# Patient Record
Sex: FEMALE | Race: BLACK OR AFRICAN AMERICAN | NOT HISPANIC OR LATINO | Employment: FULL TIME | URBAN - METROPOLITAN AREA
[De-identification: names, ages, dates, MRNs, and addresses within clinical notes are randomized per-mention and may not be internally consistent; named-entity substitution may affect disease eponyms.]

---

## 2018-01-23 ENCOUNTER — HOSPITAL ENCOUNTER (EMERGENCY)
Facility: MEDICAL CENTER | Age: 23
End: 2018-01-23
Attending: EMERGENCY MEDICINE

## 2018-01-23 VITALS
TEMPERATURE: 98.6 F | DIASTOLIC BLOOD PRESSURE: 86 MMHG | SYSTOLIC BLOOD PRESSURE: 140 MMHG | HEART RATE: 72 BPM | RESPIRATION RATE: 18 BRPM | HEIGHT: 65 IN | BODY MASS INDEX: 23.14 KG/M2 | OXYGEN SATURATION: 98 % | WEIGHT: 138.89 LBS

## 2018-01-23 DIAGNOSIS — R51.9 ACUTE NONINTRACTABLE HEADACHE, UNSPECIFIED HEADACHE TYPE: ICD-10-CM

## 2018-01-23 DIAGNOSIS — K08.89 PAIN, DENTAL: ICD-10-CM

## 2018-01-23 PROCEDURE — 96372 THER/PROPH/DIAG INJ SC/IM: CPT

## 2018-01-23 PROCEDURE — 700102 HCHG RX REV CODE 250 W/ 637 OVERRIDE(OP): Performed by: EMERGENCY MEDICINE

## 2018-01-23 PROCEDURE — 99284 EMERGENCY DEPT VISIT MOD MDM: CPT

## 2018-01-23 PROCEDURE — A9270 NON-COVERED ITEM OR SERVICE: HCPCS | Performed by: EMERGENCY MEDICINE

## 2018-01-23 PROCEDURE — 700111 HCHG RX REV CODE 636 W/ 250 OVERRIDE (IP): Performed by: EMERGENCY MEDICINE

## 2018-01-23 RX ORDER — CLINDAMYCIN HYDROCHLORIDE 150 MG/1
300 CAPSULE ORAL ONCE
Status: COMPLETED | OUTPATIENT
Start: 2018-01-23 | End: 2018-01-23

## 2018-01-23 RX ORDER — OXYCODONE HYDROCHLORIDE AND ACETAMINOPHEN 5; 325 MG/1; MG/1
1-2 TABLET ORAL EVERY 4 HOURS PRN
Qty: 10 TAB | Refills: 0 | Status: SHIPPED | OUTPATIENT
Start: 2018-01-23 | End: 2018-01-26

## 2018-01-23 RX ORDER — KETOROLAC TROMETHAMINE 30 MG/ML
30 INJECTION, SOLUTION INTRAMUSCULAR; INTRAVENOUS ONCE
Status: COMPLETED | OUTPATIENT
Start: 2018-01-23 | End: 2018-01-23

## 2018-01-23 RX ORDER — CLINDAMYCIN HYDROCHLORIDE 300 MG/1
300 CAPSULE ORAL 3 TIMES DAILY
Qty: 21 CAP | Refills: 0 | Status: SHIPPED | OUTPATIENT
Start: 2018-01-23 | End: 2018-01-30

## 2018-01-23 RX ADMIN — KETOROLAC TROMETHAMINE 30 MG: 30 INJECTION, SOLUTION INTRAMUSCULAR at 01:48

## 2018-01-23 RX ADMIN — CLINDAMYCIN HYDROCHLORIDE 300 MG: 150 CAPSULE ORAL at 01:48

## 2018-01-23 ASSESSMENT — LIFESTYLE VARIABLES: DO YOU DRINK ALCOHOL: NO

## 2018-01-23 ASSESSMENT — PAIN SCALES - GENERAL: PAINLEVEL_OUTOF10: 9

## 2018-01-23 NOTE — ED NOTES
"Farrah Estrada    Chief Complaint   Patient presents with   • Dental Pain     upper and lower L side    • Migraine       Pt ambulatory to triage with above complaint. Dental pain on L side radiating to head/neck. +photophobia  Denies N/V. VSS.  Pt returned to lobby, educated on triage process, and to inform staff of any changes or concerns.    Blood Pressure: 140/86, Pulse: 72, Respiration: 18, Temperature: 37 °C (98.6 °F), Height: 165.1 cm (5' 5\"), Weight: 63 kg (138 lb 14.2 oz), Pulse Oximetry: 98 %    "

## 2018-01-23 NOTE — DISCHARGE INSTRUCTIONS
Follow-up with dentist, , this week for reevaluation and medication management.  Follow-up with primary care physician this week for reevaluation and close blood pressure monitoring.    Clindamycin 3 times daily for 1 week for suspected dental infection.  Continue ibuprofen every 4-6 hours as needed for discomfort. Percocet every 4-6 hours as needed for breakthrough pain.    Soft diet as tolerated.    Return to the emergency department for persistent or worsening dental pain, facial pain, headache, facial swelling or redness, oral swelling or difficulty breathing or swallowing, neck pain or stiffness, fever, vomiting or other new concerns.    Dental Pain  Dental pain may be caused by many things, including:  · Tooth decay (cavities or caries). Cavities cause the nerve of your tooth to be open to air and hot or cold temperatures. This can cause pain or discomfort.  · Abscess or infection. A dental abscess is an area that is full of infected pus from a bacterial infection in the inner part of the tooth (pulp). It usually happens at the end of the tooth's root.  · Injury.  · An unknown reason (idiopathic).  Your pain may be mild or severe. It may only happen when:  · You are chewing.  · You are exposed to hot or cold temperature.  · You are eating or drinking sugary foods or beverages, such as:  ¨ Soda.  ¨ Candy.  Your pain may also be there all of the time.  HOME CARE  Watch your dental pain for any changes. Do these things to lessen your discomfort:  · Take medicines only as told by your dentist.  · If your dentist tells you to take an antibiotic medicine, finish all of it even if you start to feel better.  · Keep all follow-up visits as told by your dentist. This is important.  · Do not apply heat to the outside of your face.  · Rinse your mouth or gargle with salt water if told by your dentist. This helps with pain and swelling.  ¨ You can make salt water by adding ¼ tsp of salt to 1 cup of warm  water.  · Apply ice to the painful area of your face:  ¨ Put ice in a plastic bag.  ¨ Place a towel between your skin and the bag.  ¨ Leave the ice on for 20 minutes, 2-3 times per day.  · Avoid foods or drinks that cause you pain, such as:  ¨ Very hot or very cold foods or drinks.  ¨ Sweet or sugary foods or drinks.  GET HELP IF:  · Your pain is not helped with medicines.  · Your symptoms are worse.  · You have new symptoms.  GET HELP RIGHT AWAY IF:  · You cannot open your mouth.  · You are having trouble breathing or swallowing.  · You have a fever.  · Your face, neck, or jaw is puffy (swollen).     This information is not intended to replace advice given to you by your health care provider. Make sure you discuss any questions you have with your health care provider.     Document Released: 06/05/2009 Document Revised: 05/03/2016 Document Reviewed: 12/14/2015  [employer] Interactive Patient Education ©2016 [employer]    General Headache Without Cause  A headache is pain or discomfort felt around the head or neck area. The specific cause of a headache may not be found. There are many causes and types of headaches. A few common ones are:  · Tension headaches.  · Migraine headaches.  · Cluster headaches.  · Chronic daily headaches.  HOME CARE INSTRUCTIONS   · Keep all follow-up appointments with your health care provider or any specialist referral.  · Only take over-the-counter or prescription medicines for pain or discomfort as directed by your health care provider.  · Lie down in a dark, quiet room when you have a headache.  · Keep a headache journal to find out what may trigger your migraine headaches. For example, write down:  ¨ What you eat and drink.  ¨ How much sleep you get.  ¨ Any change to your diet or medicines.  · Try massage or other relaxation techniques.  · Put ice packs or heat on the head and neck. Use these 3 to 4 times per day for 15 to 20 minutes each time, or as needed.  · Limit stress.  · Sit up  straight, and do not tense your muscles.  · Quit smoking if you smoke.  · Limit alcohol use.  · Decrease the amount of caffeine you drink, or stop drinking caffeine.  · Eat and sleep on a regular schedule.  · Get 7 to 9 hours of sleep, or as recommended by your health care provider.  · Keep lights dim if bright lights bother you and make your headaches worse.  SEEK MEDICAL CARE IF:   · You have problems with the medicines you were prescribed.  · Your medicines are not working.  · You have a change from the usual headache.  · You have nausea or vomiting.  SEEK IMMEDIATE MEDICAL CARE IF:   · Your headache becomes severe.  · You have a fever.  · You have a stiff neck.  · You have loss of vision.  · You have muscular weakness or loss of muscle control.  · You start losing your balance or have trouble walking.  · You feel faint or pass out.  · You have severe symptoms that are different from your first symptoms.     This information is not intended to replace advice given to you by your health care provider. Make sure you discuss any questions you have with your health care provider.     Document Released: 12/18/2006 Document Revised: 05/03/2016 Document Reviewed: 01/02/2013  [employer] Interactive Patient Education ©2016 [employer]

## 2018-01-23 NOTE — ED PROVIDER NOTES
"ED Provider Note    CHIEF COMPLAINT  Chief Complaint   Patient presents with   • Dental Pain     upper and lower L side    • Migraine       HPI  Farrah Estrada is a 22 y.o. female who ambulates to the emergency department with her significant other complaining of dental pain. Patient states she had sudden onset of pain in her left upper molar region a few hours ago, throbbing, constant with pain radiating towards her left ear and head. Headache, also throbbing, greater on the left side, constant, 6 out of 10. History of similar headaches frequently. No visual changes, slurred speech or focal weakness. No fever or chills. No nausea or vomiting. No facial swelling or redness. No neck pain or stiffness. No improvement with ibuprofen prior to arrival.    REVIEW OF SYSTEMS  See HPI for further details.     PAST MEDICAL HISTORY   denies    SOCIAL HISTORY  Social History     Social History Main Topics   • Smoking status: Never Smoker   • Smokeless tobacco: Not on file   • Alcohol use No   • Drug use: No   • Sexual activity: Not on file       SURGICAL HISTORY  patient denies any surgical history    CURRENT MEDICATIONS  Home Medications     Reviewed by Jose L Sam REnmaNEnma (Registered Nurse) on 01/23/18 at 0110  Med List Status: Not Addressed   Medication Last Dose Status   docusate sodium 100 MG Cap  Active   ibuprofen (MOTRIN) 600 MG Tab  Active   oxycodone-acetaminophen (PERCOCET) 5-325 MG Tab  Active   Prenatal MV-Min-Fe Fum-FA-DHA (PRENATAL 1 PO)  Active                ALLERGIES  Allergies   Allergen Reactions   • Aspirin Anaphylaxis     Hives and tongue swells     • Kiwi Extract Anaphylaxis     Hives and tongue swells.   Tolerates ibuprofen/Motrin without difficulty    PHYSICAL EXAM  VITAL SIGNS: /86   Pulse 72   Temp 37 °C (98.6 °F)   Resp 18   Ht 1.651 m (5' 5\")   Wt 63 kg (138 lb 14.2 oz)   LMP 01/06/2018 (Within Weeks)   SpO2 98%   Breastfeeding? No   BMI 23.11 kg/m²   Pulse ox interpretation: " I interpret this pulse ox as normal.  Constitutional: Alert in no apparent distress. Flat affect.  HENT: Normocephalic, atraumatic. Bilateral external ears normal, TMs clear bilaterally. No mastoid tenderness, erythema or induration. Nose normal. Moist mucous membranes.  Evidence for previous dental work, heavy feeling in Left lower teeth. No gross dental carry or tooth fracture noticed in region of the pain. There is some evidence for impacted left upper rear molar. No facial cellulitis. Minimal swelling left cheek without induration or fluctuance. Minimal tenderness to palpation that extends towards the anterior ear.  No gingival or other oral swelling or fluctuance. Uvula midline. Tolerating secretions. No lingual elevation. No stridor or dysphonia.  Eyes: Pupils are equal and reactive, Conjunctiva normal.   Neck: Normal range of motion, Supple. No meningeal irritation.  Lymphatic: No lymphadenopathy noted. No cervical, submandibular or auricular lymphadenopathy.  Cardiovascular: Normal peripheral perfusion.  Thorax & Lungs: Nonlabored respirations.  Skin: Warm, Dry, No erythema, No rash.   Musculoskeletal: Good range of motion in all major joints.   Neurologic: Alert , no gross focal deficit noted. Cranial nerves II through XII intact bilaterally. Speech clear and coherent. Gait stable independently.  Psychiatric: Flat affect. Cooperative.      COURSE & MEDICAL DECISION MAKING  Nursing notes and vital signs were reviewed. (See chart for details)  The patients records were reviewed, history was obtained from the patient;     ED evaluation for dental pain is quite unrevealing, however symptomatology is most consistent with infected dental caries. Symptomatology is similar to previous infection. There is evidence for a partially impacted wisdom tooth as well. A trigeminal neuralgia, parotitis remain in the differential but appear less likely at this time. No clinical evidence for otitis media, pharyngitis,  mastoiditis or meningitis. Airway is intact. Tolerating oral medications and water without difficulty. Vital signs are stable without fever or tachycardia. Pain significantly improved after Toradol. 1st dose of clindamycin (penicillin allergic) given in the emergency department.    Patient is stable for discharge at this time, anticipatory guidance provided, clindamycin for 7 days, Percocet for breakthrough pain, close follow-up is encouraged, and strict ED return instructions have been detailed. Patient is agreeable to the disposition and plan.    Patient's blood pressure was elevated in the emergency department, and has been referred to primary care for close monitoring.     reviewed, no pattern for concern.  In prescribing controlled substances to this patient, I certify that I have obtained and reviewed the medical history of Farrah Estrada. I have also made a good yousuf effort to obtain applicable records from other providers who have treated the patient and records did not demonstrate any increased risk of substance abuse that would prevent me from prescribing controlled substances.     I have conducted a physical exam and documented it. I have reviewed Ms. Estrada’s prescription history as maintained by the [place] Prescription Monitoring Program.     I have assessed the patient’s risk for abuse, dependency, and addiction using the validated Opioid Risk Tool available at https://www.mdcalc.com/qypcsg-imbn-qppf-ort-narcotic-abuse.     Given the above, I believe the benefits of controlled substance therapy outweigh the risks. The reasons for prescribing controlled substances include non-narcotic, oral analgesic alternatives have been inadequate for pain control. Accordingly, I have discussed the risk and benefits, treatment plan, and alternative therapies with the patient.       FINAL IMPRESSION  (K08.89) Pain, dental  (R51) Acute nonintractable headache, unspecified headache type      Electronically signed  by: Riana Kelly, 1/23/2018 1:33 AM      This dictation was created using voice recognition software. The accuracy of the dictation is limited to the abilities of the software. I expect there may be some errors of grammar and possibly content. The nursing notes were reviewed and certain aspects of this information were incorporated into this note.

## 2018-04-27 ENCOUNTER — HOSPITAL ENCOUNTER (EMERGENCY)
Facility: MEDICAL CENTER | Age: 23
End: 2018-04-28
Attending: EMERGENCY MEDICINE

## 2018-04-27 DIAGNOSIS — K02.9 PAIN DUE TO DENTAL CARIES: ICD-10-CM

## 2018-04-27 PROCEDURE — 700111 HCHG RX REV CODE 636 W/ 250 OVERRIDE (IP): Performed by: EMERGENCY MEDICINE

## 2018-04-27 PROCEDURE — 99284 EMERGENCY DEPT VISIT MOD MDM: CPT

## 2018-04-27 PROCEDURE — 96372 THER/PROPH/DIAG INJ SC/IM: CPT

## 2018-04-27 RX ORDER — MORPHINE SULFATE 4 MG/ML
4 INJECTION, SOLUTION INTRAMUSCULAR; INTRAVENOUS ONCE
Status: COMPLETED | OUTPATIENT
Start: 2018-04-28 | End: 2018-04-27

## 2018-04-27 RX ORDER — ONDANSETRON 2 MG/ML
4 INJECTION INTRAMUSCULAR; INTRAVENOUS ONCE
Status: COMPLETED | OUTPATIENT
Start: 2018-04-28 | End: 2018-04-27

## 2018-04-27 RX ADMIN — ONDANSETRON 4 MG: 2 INJECTION, SOLUTION INTRAMUSCULAR; INTRAVENOUS at 23:40

## 2018-04-27 RX ADMIN — MORPHINE SULFATE 4 MG: 4 INJECTION INTRAVENOUS at 23:40

## 2018-04-27 ASSESSMENT — PAIN SCALES - GENERAL
PAINLEVEL_OUTOF10: 3
PAINLEVEL_OUTOF10: 10

## 2018-04-27 ASSESSMENT — ENCOUNTER SYMPTOMS
VOMITING: 1
FEVER: 1

## 2018-04-28 VITALS
TEMPERATURE: 98.7 F | SYSTOLIC BLOOD PRESSURE: 135 MMHG | BODY MASS INDEX: 22.08 KG/M2 | OXYGEN SATURATION: 96 % | RESPIRATION RATE: 18 BRPM | HEIGHT: 65 IN | HEART RATE: 81 BPM | WEIGHT: 132.5 LBS | DIASTOLIC BLOOD PRESSURE: 74 MMHG

## 2018-04-28 PROCEDURE — A9270 NON-COVERED ITEM OR SERVICE: HCPCS | Performed by: EMERGENCY MEDICINE

## 2018-04-28 PROCEDURE — 700101 HCHG RX REV CODE 250: Performed by: EMERGENCY MEDICINE

## 2018-04-28 PROCEDURE — 64400 NJX AA&/STRD TRIGEMINAL NRV: CPT

## 2018-04-28 PROCEDURE — 700102 HCHG RX REV CODE 250 W/ 637 OVERRIDE(OP): Performed by: EMERGENCY MEDICINE

## 2018-04-28 RX ORDER — HYDROCODONE BITARTRATE AND ACETAMINOPHEN 5; 325 MG/1; MG/1
1 TABLET ORAL ONCE
Status: COMPLETED | OUTPATIENT
Start: 2018-04-28 | End: 2018-04-28

## 2018-04-28 RX ORDER — LIDOCAINE HYDROCHLORIDE 10 MG/ML
20 INJECTION, SOLUTION INFILTRATION; PERINEURAL ONCE
Status: COMPLETED | OUTPATIENT
Start: 2018-04-28 | End: 2018-04-28

## 2018-04-28 RX ORDER — HYDROCODONE BITARTRATE AND ACETAMINOPHEN 5; 325 MG/1; MG/1
1-2 TABLET ORAL EVERY 6 HOURS PRN
Qty: 12 TAB | Refills: 0 | Status: SHIPPED | OUTPATIENT
Start: 2018-04-28 | End: 2018-05-01

## 2018-04-28 RX ADMIN — HYDROCODONE BITARTRATE AND ACETAMINOPHEN 1 TABLET: 5; 325 TABLET ORAL at 01:04

## 2018-04-28 RX ADMIN — LIDOCAINE HYDROCHLORIDE 20 ML: 10 INJECTION, SOLUTION INFILTRATION; PERINEURAL at 01:00

## 2018-04-28 ASSESSMENT — PAIN SCALES - GENERAL: PAINLEVEL_OUTOF10: 3

## 2018-04-28 NOTE — DISCHARGE INSTRUCTIONS
Return to the ER for more pain, swelling, fever, or other concerns. Recheck if not improved in 24 hours    Dental Caries  Dental caries (cavities) are areas of tooth decay. Cavities are usually caused by a combination of poor dental care; sugar; tobacco, alcohol, and drug abuse; decreased saliva production; and receding gums. If cavities are not treated by a dentist, they grow in size. This can cause toothaches, infection, and loss of the tooth.  Cavities of the outer tooth enamel do not cause symptoms. Dental pain from cold drinks may be the first sign the enamel has broken down and decay has spread toward the root of the tooth. This can cause the tooth to die or become infected. If a cavity is treated before it causes toothache, the tooth can usually be saved. Cavities can be prevented by good oral hygiene. Brushing your teeth in the morning and before bed, and using dental floss once daily helps remove plaque and reduce bacteria.  Candy, soft drinks, and other sources of sugar promote tooth decay by promoting the growth of bacteria in the mouth. Proper diet, fluoride, dental cleaning, and fillings are important in preventing the loss of teeth from decay. Antibiotics, root canal treatment, or dental extraction may be needed if the decay is severe. Take any pain medication or antibiotics as directed by your caregiver. It is important that you follow up with a dentist for definitive care.  SEEK MEDICAL CARE IF:   · You or your child has an oral temperature above 102° F (38.9° C).   · There is difficulty opening the mouth.   · There is difficulty swallowing or handling secretions.   · There is difficulty breathing.   · There is chest pain.   · There are worsening or concerning symptoms.   Document Released: 01/25/2006 Document Revised: 03/11/2013 Document Reviewed: 04/12/2011  [employer]® Patient Information ©2013 [employer].

## 2018-04-28 NOTE — ED TRIAGE NOTES
"Chief Complaint   Patient presents with   • Dental Pain     since yesterday. pt has left sided facial pain. pt was seen at [facility] earlier today for same pain and was given clindamycin and ibuprofen, but with no releif.    • N/V     pt vomited 200 ml in triage.      Pt ambulatory to triage with above complaint, VSS, pt educated on triage process, placed back in lobby, pt instructed to notify staff of any issues.     Blood pressure 140/96, pulse 73, temperature 36.7 °C (98.1 °F), temperature source Temporal, resp. rate 18, height 1.651 m (5' 5\"), weight 60.1 kg (132 lb 7.9 oz), SpO2 97 %.    "

## 2018-04-28 NOTE — ED PROVIDER NOTES
ED Provider Note    Scribed for Anthony Kennedy M.D. by Fatoumata Rebolledo. 4/27/2018, 11:28 PM.    Primary care provider: [facility] (Inactive)  Means of arrival: walk in   History obtained from: patient   History limited by: none     CHIEF COMPLAINT  Chief Complaint   Patient presents with   • Dental Pain     since yesterday. pt has left sided facial pain. pt was seen at [facility] earlier today for same pain and was given clindamycin and ibuprofen, but with no releif.    • N/V     pt vomited 200 ml in triage.        HPI  Farrah Estrada is a 22 y.o. female who presents to the Emergency Department for generalized left sided dental pain onset one day ago. Her pain radiates to her face. Patient reports associated subjective fever. She was evaluated at [facility] earlier today and prescribed Clindamycin and Motrin which she took with no relief of her pain. She has not been seen by a dentist today. She had one episode of vomiting while in the waiting room.       REVIEW OF SYSTEMS  Review of Systems   Constitutional: Positive for fever. Chills: subjective    HENT:        + dental pain   Gastrointestinal: Positive for vomiting.    E.     PAST MEDICAL HISTORY   has a past medical history of Known health problems: none.      SURGICAL HISTORY  patient denies any surgical history      SOCIAL HISTORY  Social History   Substance Use Topics   • Smoking status: Never Smoker        • Alcohol use No      History   Drug Use No       FAMILY HISTORY  None noted       CURRENT MEDICATIONS  Home Medications     Reviewed by Keely Padilla R.N. (Registered Nurse) on 04/27/18 at 2322  Med List Status: Not Addressed   Medication Last Dose Status   docusate sodium 100 MG Cap  Active   ibuprofen (MOTRIN) 600 MG Tab  Active   Prenatal MV-Min-Fe Fum-FA-DHA (PRENATAL 1 PO)  Active                ALLERGIES  Allergies   Allergen Reactions   • Amoxicillin Anaphylaxis   • Aspirin Anaphylaxis     Hives and tongue swells     • Kiwi Extract  "Anaphylaxis     Hives and tongue swells.       PHYSICAL EXAM  VITAL SIGNS: /96   Pulse 73   Temp 36.7 °C (98.1 °F) (Temporal)   Resp 18   Ht 1.651 m (5' 5\")   Wt 60.1 kg (132 lb 7.9 oz)   SpO2 97%   BMI 22.05 kg/m²   Vitals reviewed.  Constitutional: Well developed, Well nourished, No acute distress, Non-toxic appearance.   HENT: Normocephalic, Atraumatic, Bilateral external ears normal, Oropharynx moist, No oral exudates, Nose normal. Tooth number 13 has crown in place with tenderness to percussion.  Not significant for swollen.  No facial swelling.  No sinus tenderness.  Eyes: PERRL, EOMI, Conjunctiva normal, No discharge.   Neck: Normal range of motion, No tenderness, Supple, No stridor.   Cardiovascular: Normal heart rate, Normal rhythm, No murmurs, No rubs, No gallops.   Thorax & Lungs: Normal breath sounds, No respiratory distress, No wheezing,    Musculoskeletal: Good range of motion in all major joints.  Neurologic: Alert,No focal deficits noted.   Psychiatric: Anxious.         COURSE & MEDICAL DECISION MAKING  Pertinent Labs & Imaging studies reviewed. (See chart for details)    11:28 PM Patient seen and examined at bedside. Patient will be treated with morphine 4 mg and Zofran 4 mg for her symptoms.      Patient was reassessed and ordered Norco.  Her pain is not well controlled and a lidocaine bile for injection for a dental block.  This is performed.  The patient actually had pretty good results.    Procedure note.    The patient's infraorbital nerve is blocked with 3 mL of 1% lidocaine without epinephrine.  No complication.  Tolerated well.  Good relief.      .  The patient has been here before for dental pain.  She has other previous workup for pain.  The similar area.  Other diagnostic etiologies considered in the past for trigeminal neuralgia.  At this point.  Her tooth is exquisitely tender to percussion.  I think this is the source of her pain.  She didn't seem a dentist and told she " requires extraction was not be able to she is afebrile.  She has no purulent drainage, no sniffing any headache or neck stiffness.  No signs of meningitis.  She is on antibiotics.  She has a penicillin allergy.  She is not on pain meds.    Pressure monitoring.  History is reviewed.  Medical score is reviewed.  Remainder the website is otherwise unavailable for review.    The patient is referred to a primary physician for blood pressure management, diabetic screening, and for all other preventative health concerns.          In prescribing controlled substances to this patient, I certify that I have obtained and reviewed the medical history of Farrah Estrada. I have also made a good yousuf effort to obtain applicable records from other providers who have treated the patient and no other records are available at this time.     I have conducted a physical exam and documented it. I have reviewed Ms. Estrada’s prescription history as maintained by the [place] Prescription Monitoring Program.     I have assessed the patient’s risk for abuse, dependency, and addiction using the validated Opioid Risk Tool available at https://www.mdcalc.com/eocokd-yidn-uynb-ort-narcotic-abuse.     Given the above, I believe the benefits of controlled substance therapy outweigh the risks. The reasons for prescribing controlled substances include in my professional opinion, controlled substances are the only reasonable choice for this patient because severe pain. Accordingly, I have discussed the risk and benefits, treatment plan, and alternative therapies with the patient.         DISPOSITION:  Patient will be discharged home in stable condition.    FOLLOW UP:  [address] [address] [phone]  Schedule an appointment as soon as possible for a visit in 2 days        OUTPATIENT MEDICATIONS:  New Prescriptions    HYDROCODONE-ACETAMINOPHEN (NORCO) 5-325 MG TAB PER TABLET    Take 1-2 Tabs by  mouth every 6 hours as needed for up to 3 days.       FINAL IMPRESSION  1. Pain due to dental caries    2.  Dental block       DAMON, Fatoumata Rebolledo (Scribe), am scribing for, and in the presence of, Anthony Kennedy M.D..  Electronically signed by: Fatoumata Rebolledo (Scribe), 4/27/2018  Anthony JOSE M.D. personally performed the services described in this documentation, as scribed by Fatoumata Rebolledo in my presence, and it is both accurate and complete.    The note accurately reflects work and decisions made by me.  Anthony Kennedy  4/28/2018  1:25 AM

## 2018-04-28 NOTE — ED NOTES
Pt AOx4.  Pt given discharge instructions with narcotic pain medication education to not drive while taking medication and pt verbalized understanding.  Pt signed narcotic consent form and discharge paperwork.  Pt ambulated to lobby with steady gait.

## 2018-05-25 ENCOUNTER — APPOINTMENT (OUTPATIENT)
Dept: RADIOLOGY | Facility: MEDICAL CENTER | Age: 23
End: 2018-05-25
Attending: EMERGENCY MEDICINE
Payer: OTHER MISCELLANEOUS

## 2018-05-25 ENCOUNTER — HOSPITAL ENCOUNTER (EMERGENCY)
Facility: MEDICAL CENTER | Age: 23
End: 2018-05-25
Attending: EMERGENCY MEDICINE
Payer: OTHER MISCELLANEOUS

## 2018-05-25 VITALS
RESPIRATION RATE: 14 BRPM | WEIGHT: 135.58 LBS | HEIGHT: 65 IN | DIASTOLIC BLOOD PRESSURE: 60 MMHG | BODY MASS INDEX: 22.59 KG/M2 | TEMPERATURE: 98.3 F | SYSTOLIC BLOOD PRESSURE: 108 MMHG | HEART RATE: 75 BPM | OXYGEN SATURATION: 95 %

## 2018-05-25 DIAGNOSIS — M54.2 NECK PAIN: ICD-10-CM
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
## 2018-05-25 PROCEDURE — 99284 EMERGENCY DEPT VISIT MOD MDM: CPT

## 2018-05-25 PROCEDURE — 71045 X-RAY EXAM CHEST 1 VIEW: CPT

## 2018-05-25 PROCEDURE — 73030 X-RAY EXAM OF SHOULDER: CPT | Mod: RT

## 2018-05-25 PROCEDURE — 72125 CT NECK SPINE W/O DYE: CPT

## 2018-05-25 PROCEDURE — 700102 HCHG RX REV CODE 250 W/ 637 OVERRIDE(OP): Performed by: EMERGENCY MEDICINE

## 2018-05-25 PROCEDURE — A9270 NON-COVERED ITEM OR SERVICE: HCPCS | Performed by: EMERGENCY MEDICINE

## 2018-05-25 RX ORDER — CYCLOBENZAPRINE HCL 10 MG
10 TABLET ORAL 3 TIMES DAILY PRN
Qty: 20 TAB | Refills: 0 | Status: SHIPPED | OUTPATIENT
Start: 2018-05-25 | End: 2018-08-23

## 2018-05-25 RX ORDER — HYDROCODONE BITARTRATE AND ACETAMINOPHEN 5; 325 MG/1; MG/1
1 TABLET ORAL ONCE
Status: COMPLETED | OUTPATIENT
Start: 2018-05-25 | End: 2018-05-25

## 2018-05-25 RX ADMIN — HYDROCODONE BITARTRATE AND ACETAMINOPHEN 1 TABLET: 5; 325 TABLET ORAL at 16:23

## 2018-05-25 ASSESSMENT — PAIN SCALES - GENERAL
PAINLEVEL_OUTOF10: 6
PAINLEVEL_OUTOF10: 5
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
## 2018-05-25 NOTE — ED TRIAGE NOTES
RN agrees with triage note. Pt ambulatory to room. Denies any numbness or tingling to extremities.
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
## 2018-05-26 NOTE — DISCHARGE INSTRUCTIONS
Return here if you have new or worse symptoms. If not well in one week see your doctor for recheck

## 2018-05-26 NOTE — ED NOTES
Pt given d/c instructions and prescriptions x 1. Pt instructed not to drink or drive while taking medications. Pt verbalized understanding. Pt d/c to self

## 2018-08-23 ENCOUNTER — HOSPITAL ENCOUNTER (EMERGENCY)
Facility: MEDICAL CENTER | Age: 23
End: 2018-08-23
Attending: EMERGENCY MEDICINE
Payer: MEDICAID

## 2018-08-23 ENCOUNTER — APPOINTMENT (OUTPATIENT)
Dept: RADIOLOGY | Facility: MEDICAL CENTER | Age: 23
End: 2018-08-23
Attending: EMERGENCY MEDICINE
Payer: MEDICAID

## 2018-08-23 VITALS
OXYGEN SATURATION: 98 % | WEIGHT: 132.5 LBS | TEMPERATURE: 98.1 F | RESPIRATION RATE: 18 BRPM | BODY MASS INDEX: 22.08 KG/M2 | SYSTOLIC BLOOD PRESSURE: 105 MMHG | HEART RATE: 75 BPM | DIASTOLIC BLOOD PRESSURE: 68 MMHG | HEIGHT: 65 IN

## 2018-08-23 DIAGNOSIS — Z33.1 IUP (INTRAUTERINE PREGNANCY), INCIDENTAL: ICD-10-CM

## 2018-08-23 DIAGNOSIS — O20.0 THREATENED MISCARRIAGE: ICD-10-CM

## 2018-08-23 DIAGNOSIS — R10.2 PELVIC PAIN: ICD-10-CM

## 2018-08-23 LAB
ALBUMIN SERPL BCP-MCNC: 4.2 G/DL (ref 3.2–4.9)
ALBUMIN/GLOB SERPL: 1.4 G/DL
ALP SERPL-CCNC: 48 U/L (ref 30–99)
ALT SERPL-CCNC: 12 U/L (ref 2–50)
ANION GAP SERPL CALC-SCNC: 5 MMOL/L (ref 0–11.9)
APPEARANCE UR: CLEAR
AST SERPL-CCNC: 18 U/L (ref 12–45)
B-HCG SERPL-ACNC: ABNORMAL MIU/ML (ref 0–10)
BASOPHILS # BLD AUTO: 0.5 % (ref 0–1.8)
BASOPHILS # BLD: 0.03 K/UL (ref 0–0.12)
BILIRUB SERPL-MCNC: 0.7 MG/DL (ref 0.1–1.5)
BILIRUB UR QL STRIP.AUTO: NEGATIVE
BUN SERPL-MCNC: 5 MG/DL (ref 8–22)
CALCIUM SERPL-MCNC: 8.7 MG/DL (ref 8.4–10.2)
CHLORIDE SERPL-SCNC: 109 MMOL/L (ref 96–112)
CO2 SERPL-SCNC: 20 MMOL/L (ref 20–33)
COLOR UR: YELLOW
CREAT SERPL-MCNC: 0.53 MG/DL (ref 0.5–1.4)
EOSINOPHIL # BLD AUTO: 0.05 K/UL (ref 0–0.51)
EOSINOPHIL NFR BLD: 0.8 % (ref 0–6.9)
ERYTHROCYTE [DISTWIDTH] IN BLOOD BY AUTOMATED COUNT: 39.8 FL (ref 35.9–50)
GLOBULIN SER CALC-MCNC: 3 G/DL (ref 1.9–3.5)
GLUCOSE SERPL-MCNC: 79 MG/DL (ref 65–99)
GLUCOSE UR STRIP.AUTO-MCNC: NEGATIVE MG/DL
HCT VFR BLD AUTO: 36.1 % (ref 37–47)
HGB BLD-MCNC: 11.9 G/DL (ref 12–16)
IMM GRANULOCYTES # BLD AUTO: 0.03 K/UL (ref 0–0.11)
IMM GRANULOCYTES NFR BLD AUTO: 0.5 % (ref 0–0.9)
KETONES UR STRIP.AUTO-MCNC: NEGATIVE MG/DL
LEUKOCYTE ESTERASE UR QL STRIP.AUTO: NEGATIVE
LIPASE SERPL-CCNC: 28 U/L (ref 7–58)
LYMPHOCYTES # BLD AUTO: 1.82 K/UL (ref 1–4.8)
LYMPHOCYTES NFR BLD: 29.4 % (ref 22–41)
MCH RBC QN AUTO: 28.4 PG (ref 27–33)
MCHC RBC AUTO-ENTMCNC: 33 G/DL (ref 33.6–35)
MCV RBC AUTO: 86.2 FL (ref 81.4–97.8)
MICRO URNS: NORMAL
MONOCYTES # BLD AUTO: 0.34 K/UL (ref 0–0.85)
MONOCYTES NFR BLD AUTO: 5.5 % (ref 0–13.4)
NEUTROPHILS # BLD AUTO: 3.91 K/UL (ref 2–7.15)
NEUTROPHILS NFR BLD: 63.3 % (ref 44–72)
NITRITE UR QL STRIP.AUTO: NEGATIVE
NRBC # BLD AUTO: 0 K/UL
NRBC BLD-RTO: 0 /100 WBC
PH UR STRIP.AUTO: 6 [PH]
PLATELET # BLD AUTO: 260 K/UL (ref 164–446)
PMV BLD AUTO: 10.8 FL (ref 9–12.9)
POTASSIUM SERPL-SCNC: 3.4 MMOL/L (ref 3.6–5.5)
PROT SERPL-MCNC: 7.2 G/DL (ref 6–8.2)
PROT UR QL STRIP: NEGATIVE MG/DL
RBC # BLD AUTO: 4.19 M/UL (ref 4.2–5.4)
RBC UR QL AUTO: NEGATIVE
SODIUM SERPL-SCNC: 134 MMOL/L (ref 135–145)
SP GR UR STRIP.AUTO: 1.02
WBC # BLD AUTO: 6.2 K/UL (ref 4.8–10.8)

## 2018-08-23 PROCEDURE — 83690 ASSAY OF LIPASE: CPT

## 2018-08-23 PROCEDURE — 99284 EMERGENCY DEPT VISIT MOD MDM: CPT

## 2018-08-23 PROCEDURE — 85025 COMPLETE CBC W/AUTO DIFF WBC: CPT

## 2018-08-23 PROCEDURE — 36415 COLL VENOUS BLD VENIPUNCTURE: CPT

## 2018-08-23 PROCEDURE — 81003 URINALYSIS AUTO W/O SCOPE: CPT

## 2018-08-23 PROCEDURE — 80053 COMPREHEN METABOLIC PANEL: CPT

## 2018-08-23 PROCEDURE — 76817 TRANSVAGINAL US OBSTETRIC: CPT

## 2018-08-23 PROCEDURE — 84702 CHORIONIC GONADOTROPIN TEST: CPT

## 2018-08-23 ASSESSMENT — ENCOUNTER SYMPTOMS
ABDOMINAL PAIN: 1
CHILLS: 0
BACK PAIN: 0
HEADACHES: 0
SHORTNESS OF BREATH: 0
NAUSEA: 1
FEVER: 0
VOMITING: 1

## 2018-08-23 NOTE — ED NOTES
Med Rec completed per patient  Allergies reviewed  No ORAL antibiotics in last 30 days    Patient stated she takes no OTC or prescription medications

## 2018-08-23 NOTE — ED NOTES
"Chief Complaint   Patient presents with   • Pelvic Pain     x 4 days   • Nausea     /59   Pulse 98   Temp 36.6 °C (97.8 °F)   Resp 18   Ht 1.651 m (5' 5\")   Wt 60.1 kg (132 lb 7.9 oz)   LMP 06/15/2018   SpO2 98%   BMI 22.05 kg/m²     Pt reports being about eight weeks pregnant, went to [facility] two days ago and was told unable to complete US and to return to [facility].  Pt currently denies any bleeding or discharge.  "
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
## 2018-08-23 NOTE — DISCHARGE INSTRUCTIONS
Ultrasound today shows an intrauterine pregnancy that is small, measures 5 weeks 5 days.  Your beta quantitative hCG was a little bit over 23,000.  It is unclear at this time, whether this is just an early gestation or a pregnancy that is not developing properly as there is no heartbeat detected.  It is important that you follow-up with your OB/GYN, [facility], for follow-up in the next 1-2 weeks.      Threatened Miscarriage  A threatened miscarriage is when you have vaginal bleeding during your first 20 weeks of pregnancy but the pregnancy has not ended. Your doctor will do tests to make sure you are still pregnant. The cause of the bleeding may not be known. This condition does not mean your pregnancy will end. It does increase the risk of it ending (complete miscarriage).  Follow these instructions at home:  · Make sure you keep all your doctor visits for prenatal care.  · Get plenty of rest.  · Do not have sex or use tampons if you have vaginal bleeding.  · Do not douche.  · Do not smoke or use drugs.  · Do not drink alcohol.  · Avoid caffeine.  Contact a doctor if:  · You have light bleeding from your vagina.  · You have belly pain or cramping.  · You have a fever.  Get help right away if:  · You have heavy bleeding from your vagina.  · You have clots of blood coming from your vagina.  · You have bad pain or cramps in your low back or belly.  · You have fever, chills, and bad belly pain.  This information is not intended to replace advice given to you by your health care provider. Make sure you discuss any questions you have with your health care provider.  Document Released: 11/30/2009 Document Revised: 05/25/2017 Document Reviewed: 10/14/2014  Elsevier Interactive Patient Education © 2017 [employer]

## 2018-09-06 ENCOUNTER — HOSPITAL ENCOUNTER (EMERGENCY)
Facility: MEDICAL CENTER | Age: 23
End: 2018-09-06
Attending: EMERGENCY MEDICINE
Payer: MEDICAID

## 2018-09-06 VITALS
OXYGEN SATURATION: 100 % | SYSTOLIC BLOOD PRESSURE: 113 MMHG | BODY MASS INDEX: 21.5 KG/M2 | WEIGHT: 129.19 LBS | RESPIRATION RATE: 15 BRPM | TEMPERATURE: 97 F | HEART RATE: 88 BPM | DIASTOLIC BLOOD PRESSURE: 63 MMHG

## 2018-09-06 DIAGNOSIS — R11.2 NON-INTRACTABLE VOMITING WITH NAUSEA, UNSPECIFIED VOMITING TYPE: ICD-10-CM

## 2018-09-06 DIAGNOSIS — Z3A.08 8 WEEKS GESTATION OF PREGNANCY: ICD-10-CM

## 2018-09-06 LAB
ALBUMIN SERPL BCP-MCNC: 5.6 G/DL (ref 3.2–4.9)
ALBUMIN/GLOB SERPL: 1.2 G/DL
ALP SERPL-CCNC: 64 U/L (ref 30–99)
ALT SERPL-CCNC: 14 U/L (ref 2–50)
ANION GAP SERPL CALC-SCNC: 13 MMOL/L (ref 0–11.9)
APPEARANCE UR: ABNORMAL
AST SERPL-CCNC: 18 U/L (ref 12–45)
BACTERIA #/AREA URNS HPF: ABNORMAL /HPF
BILIRUB SERPL-MCNC: 1.2 MG/DL (ref 0.1–1.5)
BILIRUB UR QL STRIP.AUTO: NEGATIVE
BUN SERPL-MCNC: 6 MG/DL (ref 8–22)
CALCIUM SERPL-MCNC: 10.5 MG/DL (ref 8.5–10.5)
CHLORIDE SERPL-SCNC: 100 MMOL/L (ref 96–112)
CO2 SERPL-SCNC: 20 MMOL/L (ref 20–33)
COLOR UR: YELLOW
CREAT SERPL-MCNC: 0.56 MG/DL (ref 0.5–1.4)
EPI CELLS #/AREA URNS HPF: ABNORMAL /HPF
GLOBULIN SER CALC-MCNC: 4.5 G/DL (ref 1.9–3.5)
GLUCOSE SERPL-MCNC: 76 MG/DL (ref 65–99)
GLUCOSE UR STRIP.AUTO-MCNC: NEGATIVE MG/DL
HYALINE CASTS #/AREA URNS LPF: ABNORMAL /LPF
KETONES UR STRIP.AUTO-MCNC: >=160 MG/DL
LEUKOCYTE ESTERASE UR QL STRIP.AUTO: ABNORMAL
MICRO URNS: ABNORMAL
NITRITE UR QL STRIP.AUTO: NEGATIVE
PH UR STRIP.AUTO: >=9 [PH]
POTASSIUM SERPL-SCNC: 3.5 MMOL/L (ref 3.6–5.5)
PROT SERPL-MCNC: 10.1 G/DL (ref 6–8.2)
PROT UR QL STRIP: 30 MG/DL
RBC # URNS HPF: ABNORMAL /HPF
RBC UR QL AUTO: NEGATIVE
SODIUM SERPL-SCNC: 133 MMOL/L (ref 135–145)
SP GR UR STRIP.AUTO: 1.03
UROBILINOGEN UR STRIP.AUTO-MCNC: 1 MG/DL
WBC #/AREA URNS HPF: ABNORMAL /HPF

## 2018-09-06 PROCEDURE — 80053 COMPREHEN METABOLIC PANEL: CPT

## 2018-09-06 PROCEDURE — 700111 HCHG RX REV CODE 636 W/ 250 OVERRIDE (IP): Performed by: EMERGENCY MEDICINE

## 2018-09-06 PROCEDURE — 700105 HCHG RX REV CODE 258: Performed by: EMERGENCY MEDICINE

## 2018-09-06 PROCEDURE — 99285 EMERGENCY DEPT VISIT HI MDM: CPT

## 2018-09-06 PROCEDURE — 36415 COLL VENOUS BLD VENIPUNCTURE: CPT

## 2018-09-06 PROCEDURE — 81001 URINALYSIS AUTO W/SCOPE: CPT

## 2018-09-06 PROCEDURE — 96374 THER/PROPH/DIAG INJ IV PUSH: CPT

## 2018-09-06 RX ORDER — ONDANSETRON 2 MG/ML
4 INJECTION INTRAMUSCULAR; INTRAVENOUS ONCE
Status: COMPLETED | OUTPATIENT
Start: 2018-09-06 | End: 2018-09-06

## 2018-09-06 RX ORDER — DEXTROSE AND SODIUM CHLORIDE 5; .45 G/100ML; G/100ML
INJECTION, SOLUTION INTRAVENOUS CONTINUOUS
Status: ACTIVE | OUTPATIENT
Start: 2018-09-06 | End: 2018-09-06

## 2018-09-06 RX ORDER — ONDANSETRON 4 MG/1
4 TABLET, ORALLY DISINTEGRATING ORAL EVERY 8 HOURS PRN
Qty: 20 TAB | Refills: 0 | Status: SHIPPED | OUTPATIENT
Start: 2018-09-06 | End: 2018-10-01

## 2018-09-06 RX ADMIN — ONDANSETRON 4 MG: 2 INJECTION INTRAMUSCULAR; INTRAVENOUS at 13:12

## 2018-09-06 RX ADMIN — DEXTROSE AND SODIUM CHLORIDE: 5; 450 INJECTION, SOLUTION INTRAVENOUS at 13:12

## 2018-09-06 ASSESSMENT — PAIN SCALES - GENERAL: PAINLEVEL_OUTOF10: 0

## 2018-09-06 ASSESSMENT — LIFESTYLE VARIABLES: DO YOU DRINK ALCOHOL: NO

## 2018-09-06 NOTE — ED TRIAGE NOTES
23 y/o female ambulatory to triage with c/o vomiting x 3 days. Pt states she believes she is 8 weeks pregnant.

## 2018-09-06 NOTE — ED NOTES
Pt states she is approx 8 weeks pregnant. Pt complains of n/v and loose stools x 3-4 days. Pt denies cramping or vaginal bleeding. Pt states this is her 4 pregnancy with 2 prior miscarriages and 1 live birth. Bf at bedside. NAD. VSS. Call light within reach. Will continue to monitor.

## 2018-09-06 NOTE — ED PROVIDER NOTES
ED Provider Note    CHIEF COMPLAINT  Chief Complaint   Patient presents with   • Vomiting     x 3 days   • Pregnancy     states she believes she is 8 weeks pregnant       HPI  Farrah Estrada is a 22 y.o. female who presents for evaluation of vomiting.  She has had nausea and vomiting over the past 3 days.  She is 78 weeks pregnant.  She was actually seen here on the 23rd of last month and had an ultrasound that showed an intrauterine gestation.  She has had no pelvic pain or vaginal bleeding.  She has had no fevers.  She states that she is tried everything she could find on line but nothing seems to help.    REVIEW OF SYSTEMS  See HPI for further details. All other systems negative.    PAST MEDICAL HISTORY  Past Medical History:   Diagnosis Date   • Known health problems: none        FAMILY HISTORY  History reviewed. No pertinent family history.    SOCIAL HISTORY  Social History     Social History   • Marital status: Single     Spouse name: N/A   • Number of children: N/A   • Years of education: N/A     Social History Main Topics   • Smoking status: Never Smoker   • Smokeless tobacco: Never Used   • Alcohol use No   • Drug use: No   • Sexual activity: Not on file     Other Topics Concern   • Not on file     Social History Narrative   • No narrative on file       SURGICAL HISTORY  History reviewed. No pertinent surgical history.    CURRENT MEDICATIONS  Home Medications     Reviewed by Fatoumata Billings R.N. (Registered Nurse) on 09/06/18 at 1208  Med List Status: Not Addressed   Medication Last Dose Status        Patient Julius Taking any Medications                       ALLERGIES  Allergies   Allergen Reactions   • Amoxicillin Anaphylaxis   • Aspirin Anaphylaxis     Hives and tongue swells     • Kiwi Extract Anaphylaxis     Hives and tongue swells.       PHYSICAL EXAM  VITAL SIGNS: /78   Pulse 92   Temp 36.1 °C (97 °F)   Resp 16   Wt 58.6 kg (129 lb 3 oz)   LMP 06/15/2018   SpO2 100%   BMI 21.50  kg/m²   Constitutional: Well developed, Well nourished, No acute distress, Non-toxic appearance.   HENT: Normocephalic, Atraumatic.  Eyes:  EOMI, Conjunctiva normal, No discharge.   Cardiovascular: Normal heart rate.   Thorax & Lungs: No respiratory distress.  Abdomen:  Soft, No tenderness, No masses.   Skin: Warm, Dry.  Musculoskeletal: Good range of motion in all major joints.   Neurologic: Awake and alert.    COURSE & MEDICAL DECISION MAKING  Pertinent Labs & Imaging studies reviewed. (See chart for details)  This is a 22-year-old here for evaluation of vomiting.  Patient is about 8 weeks pregnant.  She had a pelvic ultrasound 2 weeks ago that showed an intrauterine gestation at 5 weeks and 5 days.  She has had no vaginal bleeding and has no pelvic pain and therefore I do not think she requires emergent reimaging.  Her complaint is of nausea and vomiting for the past 4 days.  She is not tachycardic.  IV is established and she is hydrated with 1 L of D5 half-normal saline and treated with Zofran 4 mg IV.  Urine is obtained and is found to have greater than 160 ketones trace leukocyte esterase but negative nitrite.  Microscopic shows 0-2 WBCs, 5-10 RBCs moderate bacteria but also many epithelial cells and I do not believe that this represents an acute urinary tract infection.  Her chemistries are unremarkable.  I discussed the results of the studies with the patient.  At this point she will be discharged home.  She is feeling much better after the IV fluid and Zofran.  She is able to take ice chips.  I will provide her a prescription for Zofran.  It sounds like she is having some insurance issues but will be able to follow up with her OB/GYN next month.  I see no indication for emergent imaging.  She is to return here for any pain or vaginal bleeding.    FINAL IMPRESSION  1.  Nausea and vomiting in the first trimester pregnancy  2.   3.         Electronically signed by: Hunter Wilkinson, 9/6/2018 12:55 PM

## 2018-09-06 NOTE — DISCHARGE INSTRUCTIONS
First Trimester of Pregnancy  The first trimester of pregnancy is from week 1 until the end of week 12 (months 1 through 3). A week after a sperm fertilizes an egg, the egg will implant on the wall of the uterus. This embryo will begin to develop into a baby. Genes from you and your partner are forming the baby. The male genes determine whether the baby is a boy or a girl. At 6-8 weeks, the eyes and face are formed, and the heartbeat can be seen on ultrasound. At the end of 12 weeks, all the baby's organs are formed.   Now that you are pregnant, you will want to do everything you can to have a healthy baby. Two of the most important things are to get good prenatal care and to follow your health care provider's instructions. Prenatal care is all the medical care you receive before the baby's birth. This care will help prevent, find, and treat any problems during the pregnancy and childbirth.  BODY CHANGES  Your body goes through many changes during pregnancy. The changes vary from woman to woman.   · You may gain or lose a couple of pounds at first.  · You may feel sick to your stomach (nauseous) and throw up (vomit). If the vomiting is uncontrollable, call your health care provider.  · You may tire easily.  · You may develop headaches that can be relieved by medicines approved by your health care provider.  · You may urinate more often. Painful urination may mean you have a bladder infection.  · You may develop heartburn as a result of your pregnancy.  · You may develop constipation because certain hormones are causing the muscles that push waste through your intestines to slow down.  · You may develop hemorrhoids or swollen, bulging veins (varicose veins).  · Your breasts may begin to grow larger and become tender. Your nipples may stick out more, and the tissue that surrounds them (areola) may become darker.  · Your gums may bleed and may be sensitive to brushing and flossing.  · Dark spots or blotches (chloasma,  mask of pregnancy) may develop on your face. This will likely fade after the baby is born.  · Your menstrual periods will stop.  · You may have a loss of appetite.  · You may develop cravings for certain kinds of food.  · You may have changes in your emotions from day to day, such as being excited to be pregnant or being concerned that something may go wrong with the pregnancy and baby.  · You may have more vivid and strange dreams.  · You may have changes in your hair. These can include thickening of your hair, rapid growth, and changes in texture. Some women also have hair loss during or after pregnancy, or hair that feels dry or thin. Your hair will most likely return to normal after your baby is born.  WHAT TO EXPECT AT YOUR PRENATAL VISITS  During a routine prenatal visit:  · You will be weighed to make sure you and the baby are growing normally.  · Your blood pressure will be taken.  · Your abdomen will be measured to track your baby's growth.  · The fetal heartbeat will be listened to starting around week 10 or 12 of your pregnancy.  · Test results from any previous visits will be discussed.  Your health care provider may ask you:  · How you are feeling.  · If you are feeling the baby move.  · If you have had any abnormal symptoms, such as leaking fluid, bleeding, severe headaches, or abdominal cramping.  · If you are using any tobacco products, including cigarettes, chewing tobacco, and electronic cigarettes.  · If you have any questions.  Other tests that may be performed during your first trimester include:  · Blood tests to find your blood type and to check for the presence of any previous infections. They will also be used to check for low iron levels (anemia) and Rh antibodies. Later in the pregnancy, blood tests for diabetes will be done along with other tests if problems develop.  · Urine tests to check for infections, diabetes, or protein in the urine.  · An ultrasound to confirm the proper growth  and development of the baby.  · An amniocentesis to check for possible genetic problems.  · Fetal screens for spina bifida and Down syndrome.  · You may need other tests to make sure you and the baby are doing well.  · HIV (human immunodeficiency virus) testing. Routine prenatal testing includes screening for HIV, unless you choose not to have this test.  HOME CARE INSTRUCTIONS   Medicines   · Follow your health care provider's instructions regarding medicine use. Specific medicines may be either safe or unsafe to take during pregnancy.  · Take your prenatal vitamins as directed.  · If you develop constipation, try taking a stool softener if your health care provider approves.  Diet   · Eat regular, well-balanced meals. Choose a variety of foods, such as meat or vegetable-based protein, fish, milk and low-fat dairy products, vegetables, fruits, and whole grain breads and cereals. Your health care provider will help you determine the amount of weight gain that is right for you.  · Avoid raw meat and uncooked cheese. These carry germs that can cause birth defects in the baby.  · Eating four or five small meals rather than three large meals a day may help relieve nausea and vomiting. If you start to feel nauseous, eating a few soda crackers can be helpful. Drinking liquids between meals instead of during meals also seems to help nausea and vomiting.  · If you develop constipation, eat more high-fiber foods, such as fresh vegetables or fruit and whole grains. Drink enough fluids to keep your urine clear or pale yellow.  Activity and Exercise   · Exercise only as directed by your health care provider. Exercising will help you:  ¨ Control your weight.  ¨ Stay in shape.  ¨ Be prepared for labor and delivery.  · Experiencing pain or cramping in the lower abdomen or low back is a good sign that you should stop exercising. Check with your health care provider before continuing normal exercises.  · Try to avoid standing for  long periods of time. Move your legs often if you must  one place for a long time.  · Avoid heavy lifting.  · Wear low-heeled shoes, and practice good posture.  · You may continue to have sex unless your health care provider directs you otherwise.  Relief of Pain or Discomfort   · Wear a good support bra for breast tenderness.    · Take warm sitz baths to soothe any pain or discomfort caused by hemorrhoids. Use hemorrhoid cream if your health care provider approves.    · Rest with your legs elevated if you have leg cramps or low back pain.  · If you develop varicose veins in your legs, wear support hose. Elevate your feet for 15 minutes, 3-4 times a day. Limit salt in your diet.  Prenatal Care   · Schedule your prenatal visits by the twelfth week of pregnancy. They are usually scheduled monthly at first, then more often in the last 2 months before delivery.  · Write down your questions. Take them to your prenatal visits.  · Keep all your prenatal visits as directed by your health care provider.  Safety   · Wear your seat belt at all times when driving.  · Make a list of emergency phone numbers, including numbers for family, friends, the hospital, and police and fire departments.  General Tips   · Ask your health care provider for a referral to a local prenatal education class. Begin classes no later than at the beginning of month 6 of your pregnancy.  · Ask for help if you have counseling or nutritional needs during pregnancy. Your health care provider can offer advice or refer you to specialists for help with various needs.  · Do not use hot tubs, steam rooms, or saunas.  · Do not douche or use tampons or scented sanitary pads.  · Do not cross your legs for long periods of time.  · Avoid cat litter boxes and soil used by cats. These carry germs that can cause birth defects in the baby and possibly loss of the fetus by miscarriage or stillbirth.  · Avoid all smoking, herbs, alcohol, and medicines not  prescribed by your health care provider. Chemicals in these affect the formation and growth of the baby.  · Do not use any tobacco products, including cigarettes, chewing tobacco, and electronic cigarettes. If you need help quitting, ask your health care provider. You may receive counseling support and other resources to help you quit.  · Schedule a dentist appointment. At home, brush your teeth with a soft toothbrush and be gentle when you floss.  SEEK MEDICAL CARE IF:   · You have dizziness.  · You have mild pelvic cramps, pelvic pressure, or nagging pain in the abdominal area.  · You have persistent nausea, vomiting, or diarrhea.  · You have a bad smelling vaginal discharge.  · You have pain with urination.  · You notice increased swelling in your face, hands, legs, or ankles.  SEEK IMMEDIATE MEDICAL CARE IF:   · You have a fever.  · You are leaking fluid from your vagina.  · You have spotting or bleeding from your vagina.  · You have severe abdominal cramping or pain.  · You have rapid weight gain or loss.  · You vomit blood or material that looks like coffee grounds.  · You are exposed to Amharic measles and have never had them.  · You are exposed to fifth disease or chickenpox.  · You develop a severe headache.  · You have shortness of breath.  · You have any kind of trauma, such as from a fall or a car accident.  This information is not intended to replace advice given to you by your health care provider. Make sure you discuss any questions you have with your health care provider.  Document Released: 12/12/2002 Document Revised: 01/08/2016 Document Reviewed: 10/28/2014  [employer] Interactive Patient Education © 2017 [employer]    Vomiting, Adult  Vomiting occurs when stomach contents are thrown up and out of the mouth. Many people notice nausea before vomiting. Vomiting can make you feel weak and dehydrated. Dehydration can make you tired and thirsty, cause you to have a dry mouth, and decrease how often you  urinate. Older adults and people who have other diseases or a weak immune system are at higher risk for dehydration. It is important to treat vomiting as told by your health care provider.  Follow these instructions at home:  Follow your health care provider’s instructions about how to care for yourself at home.  Eating and drinking  Follow these recommendations as told by your health care provider:  · Take an oral rehydration solution (ORS). This is a drink that is sold at pharmacies and retail stores.  · Eat bland, easy-to-digest foods in small amounts as you are able. These foods include bananas, applesauce, rice, lean meats, toast, and crackers.  · Drink clear fluids in small amounts as you are able. Clear fluids include water, ice chips, low-calorie sports drinks, and fruit juice that has water added (diluted fruit juice).  · Avoid fluids that contain a lot of sugar or caffeine.  · Avoid alcohol and foods that are spicy or fatty.  General instructions  · Wash your hands frequently with soap and water. If soap and water are not available, use hand . Make sure that everyone in your household washes their hands frequently.  · Take over-the-counter and prescription medicines only as told by your health care provider.  · Watch your condition for any changes.  · Keep all follow-up visits as told by your health care provider. This is important.  Contact a health care provider if:  · You have a fever.  · You are not able to keep fluids down.  · Your vomiting gets worse.  · You have new symptoms.  · You feel light-headed or dizzy.  · You have a headache.  · You have muscle cramps.  Get help right away if:  · You have pain in your chest, neck, arm, or jaw.  · You feel extremely weak or you faint.  · You have persistent vomiting.  · You have vomit that is bright red or looks like black coffee grounds.  · You have stools that are bloody or black, or stools that look like tar.  · You have severe pain, cramping, or  bloating in your abdomen.  · You have a severe headache, a stiff neck, or both.  · You have a rash.  · You have trouble breathing or you are breathing very quickly.  · Your heart is beating very quickly.  · Your skin feels cold and clammy.  · You feel confused.  · You have pain while urinating.  · You have signs of dehydration, such as:  ¨ Dark urine, or very little or no urine.  ¨ Cracked lips.  ¨ Dry mouth.  ¨ Sunken eyes.  ¨ Sleepiness.  ¨ Weakness.  These symptoms may represent a serious problem that is an emergency. Do not wait to see if the symptoms will go away. Get medical help right away. Call your local emergency services (911 in the [place] Do not drive yourself to the hospital.   This information is not intended to replace advice given to you by your health care provider. Make sure you discuss any questions you have with your health care provider.  Document Released: 01/13/2017 Document Revised: 05/25/2017 Document Reviewed: 08/23/2016  [employer] Interactive Patient Education © 2017 [employer]

## 2018-09-06 NOTE — ED TRIAGE NOTES
Pt discharged home as ordered by erp. Pt instructed to follow up with OB. Pt verbalized understanding. Pt given RX and left ambulating independently

## 2018-10-01 ENCOUNTER — HOSPITAL ENCOUNTER (EMERGENCY)
Facility: MEDICAL CENTER | Age: 23
End: 2018-10-01
Attending: EMERGENCY MEDICINE
Payer: MEDICAID

## 2018-10-01 VITALS
SYSTOLIC BLOOD PRESSURE: 126 MMHG | HEART RATE: 85 BPM | RESPIRATION RATE: 16 BRPM | OXYGEN SATURATION: 95 % | BODY MASS INDEX: 22.15 KG/M2 | HEIGHT: 65 IN | WEIGHT: 132.94 LBS | TEMPERATURE: 98.6 F | DIASTOLIC BLOOD PRESSURE: 93 MMHG

## 2018-10-01 DIAGNOSIS — K04.7 DENTAL INFECTION: ICD-10-CM

## 2018-10-01 DIAGNOSIS — K08.89 PAIN, DENTAL: ICD-10-CM

## 2018-10-01 PROCEDURE — 99284 EMERGENCY DEPT VISIT MOD MDM: CPT

## 2018-10-01 PROCEDURE — 700102 HCHG RX REV CODE 250 W/ 637 OVERRIDE(OP): Performed by: EMERGENCY MEDICINE

## 2018-10-01 PROCEDURE — A9270 NON-COVERED ITEM OR SERVICE: HCPCS | Performed by: EMERGENCY MEDICINE

## 2018-10-01 RX ORDER — AZITHROMYCIN 250 MG/1
TABLET, FILM COATED ORAL
Qty: 4 TAB | Refills: 0 | Status: SHIPPED | OUTPATIENT
Start: 2018-10-01 | End: 2019-01-22

## 2018-10-01 RX ORDER — AZITHROMYCIN 250 MG/1
500 TABLET, FILM COATED ORAL ONCE
Status: COMPLETED | OUTPATIENT
Start: 2018-10-01 | End: 2018-10-01

## 2018-10-01 RX ADMIN — AZITHROMYCIN 500 MG: 250 TABLET, FILM COATED ORAL at 21:56

## 2018-10-01 ASSESSMENT — PAIN SCALES - GENERAL: PAINLEVEL_OUTOF10: 9

## 2018-10-02 NOTE — ED TRIAGE NOTES
"Chief Complaint   Patient presents with   • Dental Pain     L upper. Visible open area near tooth/cap in place.      /94   Pulse 92   Temp 37.5 °C (99.5 °F)   Resp 18   Ht 1.651 m (5' 5\")   Wt 60.3 kg (132 lb 15 oz)   LMP 06/15/2018   SpO2 96%   BMI 22.12 kg/m²     Pt ambulatory to triage for above, steady on feet. Reports her dentist's office is closing and she needs to find a new one. Took tylenol at home with a few leftover clindamycin without relief. Pt is currently pregnant. Pt returned to [facility], educated on triage process, instructed to notify staff of worsening concerns.   "

## 2018-10-02 NOTE — ED PROVIDER NOTES
"ED Provider Note    Scribed for Ta Espinoza M.D. by Aman Wilde. 10/1/2018  9:38 PM    Means of arrival: Walk-in  History obtained from: Patient  History limited by: None    CHIEF COMPLAINT  Chief Complaint   Patient presents with   • Dental Pain     L upper. Visible open area near tooth/cap in place.        HPI    Farrah Estrada is a 22 y.o. female presenting with left dental pain onset 2 days ago, worse when chewing or opening her jaw. An old prescription for clindamycin and tylenol did not help her pain. She denies any fever or drainage. She is currently pregnant and is allergic to amoxicillin.    REVIEW OF SYSTEMS  Pertinent positives include dental pain.  Pertinent negatives include No fever or drainage.  10 + review of systems otherwise negative     PAST MEDICAL HISTORY   has a past medical history of Known health problems: none.    SOCIAL HISTORY  Social History     Social History Main Topics   • Smoking status: Never Smoker   • Smokeless tobacco: Never Used   • Alcohol use No   • Drug use: No       SURGICAL HISTORY  patient denies any surgical history    CURRENT MEDICATIONS  Home Medications     Reviewed by Patricia Santana R.N. (Registered Nurse) on 10/01/18 at 2109  Med List Status: Partial   Medication Last Dose Status   Prenatal Multivit-Min-Fe-FA (DHEERAJ-MILLER PO)  Active                ALLERGIES  Allergies   Allergen Reactions   • Amoxicillin Anaphylaxis   • Aspirin Anaphylaxis     Hives and tongue swells     • Kiwi Extract Anaphylaxis     Hives and tongue swells.         PHYSICAL EXAM  VITAL SIGNS: /94   Pulse 92   Temp 37.5 °C (99.5 °F)   Resp 18   Ht 1.651 m (5' 5\")   Wt 60.3 kg (132 lb 15 oz)   LMP 06/15/2018   SpO2 96%   BMI 22.12 kg/m²    SpO2: I interpret this pulse oximetry as normal  Constitutional: Well developed, Well nourished, mild distress, Non-toxic appearance.   HENT: No malocclusion, no trismus. Slight redness of left upper gingiva, tender to palpation. No " "focal abscess noted. No significant gingival erythema or decay. No significant dental decay.  Eyes: PERRL, EOMI, Conjunctiva normal, No discharge.   Neck: no anterior cervical lymphadenopathy  CV: Good pulses  Thorax & Lungs: No respiratory distress.   Skin: Warm, Dry, No erythema, No rash.    Musculoskeletal: No major deformities noted.   Neurologic: Awake, alert. Moves all extremities spontaneously.  Psychiatric: Affect normal, Mood normal.     CHART REVIEW  The patient's previous medical records were reviewed and revealed the following pertinent information: Seen in this ED 1 month ago, vomiting in pregnancy, discharged home without any other acute findings    COURSE & MEDICAL DECISION MAKING  Pertinent Labs & Imaging studies reviewed. (See chart for details)    Differential diagnoses include but not limited to: Periapical abscess, dental infection, jaw abscess, doubt osteomyelitis    Vitals:    10/01/18 2011 10/01/18 2016   BP: 135/94    Pulse: 92    Resp: 18    Temp: 37.5 °C (99.5 °F)    SpO2: 96%    Weight:  60.3 kg (132 lb 15 oz)   Height: 1.651 m (5' 5\")        Medications   azithromycin (ZITHROMAX) tablet 500 mg (500 mg Oral Given 10/1/18 2156)         9:38 PM - Patient seen and examined at bedside. Discussed plan of care, including administration of clindamycin and follow-up with a dentist. The patient will be medicated with Zithromax 500 mg and will be sent home with a prescription for Zithromax. Patient given strict return precautions and care instructions. Patient agrees to the plan of care and discharge at this time.    22-year-old female signs of dental infection. No obvious abscess for drainage on exam.  No trismus, no signs of overlying cellulitis.  Has taken dose of clindamycin.  Is approximately 11 weeks pregnant.  Has anaphylactic reaction to amoxicillin.  Discussed with pharmacy, best choice will be azithromycin.  Given initial dose here due to late hour.  Advised to not take clindamycin as this " may have first trimester throughout agenic effects.  Advised close dental follow-up and given contact for outpatient dentist.  Patient or guardian given strict returns precautions and care instructions.  Advised PCP follow-up in 1-2 days.  Patient or guardian expresses understanding and agrees to plan.    DISPOSITION  Patient will be discharged home in stable condition.    FOLLOW UP:  Your primary care doctor and dentist  Call [facility] for follow up [phone], or your dentist          OUTPATIENT MEDICATIONS:  New Prescriptions    AZITHROMYCIN (ZITHROMAX) 250 MG TAB    Take one tab by mouth daily for 4 days.         FINAL IMPRESSION  Visit Diagnoses     ICD-10-CM   1. Pain, dental K08.89   2. Dental infection K04.7        Aman JOSE (Scribe), am scribing for, and in the presence of, Ta Espinoza M.D..    Electronically signed by: Aman Wilde (Scribe), 10/1/2018    ITa M.D. personally performed the services described in this documentation, as scribed by Aman Wilde in my presence, and it is both accurate and complete. E.    The note accurately reflects work and decisions made by me.  Ta Espinoza  10/2/2018  12:11 AM

## 2018-10-22 ENCOUNTER — APPOINTMENT (OUTPATIENT)
Dept: RADIOLOGY | Facility: MEDICAL CENTER | Age: 23
End: 2018-10-22
Attending: EMERGENCY MEDICINE
Payer: MEDICAID

## 2018-10-22 ENCOUNTER — HOSPITAL ENCOUNTER (EMERGENCY)
Facility: MEDICAL CENTER | Age: 23
End: 2018-10-23
Attending: EMERGENCY MEDICINE
Payer: MEDICAID

## 2018-10-22 DIAGNOSIS — O46.90 VAGINAL BLEEDING IN PREGNANCY: ICD-10-CM

## 2018-10-22 DIAGNOSIS — E86.0 DEHYDRATION: ICD-10-CM

## 2018-10-22 LAB
ALBUMIN SERPL BCP-MCNC: 4 G/DL (ref 3.2–4.9)
ALBUMIN/GLOB SERPL: 1.3 G/DL
ALP SERPL-CCNC: 47 U/L (ref 30–99)
ALT SERPL-CCNC: 18 U/L (ref 2–50)
ANION GAP SERPL CALC-SCNC: 8 MMOL/L (ref 0–11.9)
AST SERPL-CCNC: 17 U/L (ref 12–45)
BASOPHILS # BLD AUTO: 0.2 % (ref 0–1.8)
BASOPHILS # BLD: 0.02 K/UL (ref 0–0.12)
BILIRUB SERPL-MCNC: 0.5 MG/DL (ref 0.1–1.5)
BUN SERPL-MCNC: 7 MG/DL (ref 8–22)
CALCIUM SERPL-MCNC: 9.3 MG/DL (ref 8.5–10.5)
CHLORIDE SERPL-SCNC: 105 MMOL/L (ref 96–112)
CO2 SERPL-SCNC: 24 MMOL/L (ref 20–33)
CREAT SERPL-MCNC: 0.65 MG/DL (ref 0.5–1.4)
EOSINOPHIL # BLD AUTO: 0.05 K/UL (ref 0–0.51)
EOSINOPHIL NFR BLD: 0.5 % (ref 0–6.9)
ERYTHROCYTE [DISTWIDTH] IN BLOOD BY AUTOMATED COUNT: 40.1 FL (ref 35.9–50)
GLOBULIN SER CALC-MCNC: 3.2 G/DL (ref 1.9–3.5)
GLUCOSE SERPL-MCNC: 86 MG/DL (ref 65–99)
HCT VFR BLD AUTO: 34.3 % (ref 37–47)
HGB BLD-MCNC: 11.6 G/DL (ref 12–16)
IMM GRANULOCYTES # BLD AUTO: 0.04 K/UL (ref 0–0.11)
IMM GRANULOCYTES NFR BLD AUTO: 0.4 % (ref 0–0.9)
LYMPHOCYTES # BLD AUTO: 1.63 K/UL (ref 1–4.8)
LYMPHOCYTES NFR BLD: 16.7 % (ref 22–41)
MCH RBC QN AUTO: 29.4 PG (ref 27–33)
MCHC RBC AUTO-ENTMCNC: 33.8 G/DL (ref 33.6–35)
MCV RBC AUTO: 87.1 FL (ref 81.4–97.8)
MONOCYTES # BLD AUTO: 0.52 K/UL (ref 0–0.85)
MONOCYTES NFR BLD AUTO: 5.3 % (ref 0–13.4)
NEUTROPHILS # BLD AUTO: 7.51 K/UL (ref 2–7.15)
NEUTROPHILS NFR BLD: 76.9 % (ref 44–72)
NRBC # BLD AUTO: 0 K/UL
NRBC BLD-RTO: 0 /100 WBC
NUMBER OF RH DOSES IND 8505RD: NORMAL
PLATELET # BLD AUTO: 328 K/UL (ref 164–446)
PMV BLD AUTO: 10.8 FL (ref 9–12.9)
POTASSIUM SERPL-SCNC: 3.5 MMOL/L (ref 3.6–5.5)
PROT SERPL-MCNC: 7.2 G/DL (ref 6–8.2)
RBC # BLD AUTO: 3.94 M/UL (ref 4.2–5.4)
RH BLD: NORMAL
SODIUM SERPL-SCNC: 137 MMOL/L (ref 135–145)
WBC # BLD AUTO: 9.8 K/UL (ref 4.8–10.8)

## 2018-10-22 PROCEDURE — 700105 HCHG RX REV CODE 258: Performed by: EMERGENCY MEDICINE

## 2018-10-22 PROCEDURE — 80053 COMPREHEN METABOLIC PANEL: CPT

## 2018-10-22 PROCEDURE — 99284 EMERGENCY DEPT VISIT MOD MDM: CPT

## 2018-10-22 PROCEDURE — 86901 BLOOD TYPING SEROLOGIC RH(D): CPT

## 2018-10-22 PROCEDURE — 85025 COMPLETE CBC W/AUTO DIFF WBC: CPT

## 2018-10-22 RX ORDER — SODIUM CHLORIDE, SODIUM LACTATE, POTASSIUM CHLORIDE, CALCIUM CHLORIDE 600; 310; 30; 20 MG/100ML; MG/100ML; MG/100ML; MG/100ML
1000 INJECTION, SOLUTION INTRAVENOUS ONCE
Status: COMPLETED | OUTPATIENT
Start: 2018-10-22 | End: 2018-10-22

## 2018-10-22 RX ADMIN — SODIUM CHLORIDE, POTASSIUM CHLORIDE, SODIUM LACTATE AND CALCIUM CHLORIDE 1000 ML: 600; 310; 30; 20 INJECTION, SOLUTION INTRAVENOUS at 22:20

## 2018-10-23 VITALS
HEIGHT: 60 IN | TEMPERATURE: 98.4 F | DIASTOLIC BLOOD PRESSURE: 77 MMHG | HEART RATE: 86 BPM | BODY MASS INDEX: 26.4 KG/M2 | RESPIRATION RATE: 18 BRPM | WEIGHT: 134.48 LBS | SYSTOLIC BLOOD PRESSURE: 118 MMHG | OXYGEN SATURATION: 98 %

## 2018-10-23 LAB
APPEARANCE UR: CLEAR
BACTERIA #/AREA URNS HPF: NEGATIVE /HPF
BILIRUB UR QL STRIP.AUTO: NEGATIVE
COLOR UR: YELLOW
EPI CELLS #/AREA URNS HPF: ABNORMAL /HPF
GLUCOSE UR STRIP.AUTO-MCNC: NEGATIVE MG/DL
HYALINE CASTS #/AREA URNS LPF: ABNORMAL /LPF
KETONES UR STRIP.AUTO-MCNC: 40 MG/DL
LEUKOCYTE ESTERASE UR QL STRIP.AUTO: ABNORMAL
MICRO URNS: ABNORMAL
NITRITE UR QL STRIP.AUTO: NEGATIVE
PH UR STRIP.AUTO: 7 [PH]
PROT UR QL STRIP: NEGATIVE MG/DL
RBC # URNS HPF: ABNORMAL /HPF
RBC UR QL AUTO: ABNORMAL
SP GR UR STRIP.AUTO: 1.03
UROBILINOGEN UR STRIP.AUTO-MCNC: 1 MG/DL
WBC #/AREA URNS HPF: ABNORMAL /HPF

## 2018-10-23 PROCEDURE — 81001 URINALYSIS AUTO W/SCOPE: CPT

## 2018-10-23 PROCEDURE — 76805 OB US >/= 14 WKS SNGL FETUS: CPT

## 2018-10-23 NOTE — ED TRIAGE NOTES
Chief Complaint   Patient presents with   • Vaginal Bleeding     x1 hr, fill x2 pads in that hr   • Pregnancy     14 wks     Blood pressure 118/77, pulse (!) 107, temperature 36.9 °C (98.4 °F), resp. rate 16, height 1.524 m (5'), weight 61 kg (134 lb 7.7 oz), last menstrual period 06/15/2018, SpO2 99 %, not currently breastfeeding.    Pt ambulates with a steady gait c/o new onset vaginal bleeding that began approximately 1 hour ago while driving to the store. Pt denies pain, cramping.

## 2018-10-23 NOTE — ED PROVIDER NOTES
ED PROVIDER NOTE     Scribed for Sage Tucker M.D. by Duglas Mukherjee. 10/22/2018, 9:51 PM.    CHIEF COMPLAINT  Chief Complaint   Patient presents with   • Vaginal Bleeding     x1 hr, fill x2 pads in that hr   • Pregnancy     14 wks       HPI    Primary care provider: [facility]   Means of arrival: Walk in  History obtained from: Patient, mom, boyfriend  History limited by: None    Farrah Estrada is a 22 y.o. pregnant female who presents with vaginal bleeding that began 4 hours ago. The patient was driving in the car when she began bleeding. The patient has gone through 2 pads and denies any clots. She confirms associated cramping that she rates as 3/10 and emesis consistent with her morning sickness with this pregnancy.  The patient is 14 weeks pregnant. She is /A2 with two spontaneous miscarriages. The patient is followed by Dr. Parmar, OB/GYN. Her previous ultrasound was found to be normal. The patient denies any fever, cough, or runny nose or any other recent illness. No trauma or straining. The patient has been taking Prenatals and Tylenol PRN. She has a family history of Sickle Cell Anemia.     REVIEW OF SYSTEMS  Constitutional: Negative for fever or chills.   HENT: Negative for rhinorrhea or sore throat.    Respiratory: Negative for cough or shortness of breath.    Cardiovascular: Negative for chest pain or palpitations.   Gastrointestinal: Negative for nausea, vomiting, but positive for crampy lower abdominal pain.   Genitourinary: Negative for dysuria or flank pain. Positive for vaginal bleeding.  Musculoskeletal: Negative for back pain or joint pain.   Neurological: Negative for sensory or motor changes.   Endo/Heme/Allergies: Negative for weight changes or hives.   All other systems reviewed and are negative.    PAST MEDICAL HISTORY  Denies    PAST FAMILY HISTORY  Sickle Cell Anemia    SOCIAL HISTORY  Social History     Social History Main Topics   • Smoking status: Never Smoker   •  Smokeless tobacco: Never Used   • Alcohol use No   • Drug use: No       SURGICAL HISTORY  patient denies any surgical history    CURRENT MEDICATIONS  No current facility-administered medications for this encounter.     Current Outpatient Prescriptions:   •  Prenatal Multivit-Min-Fe-FA (DHEERAJ-MILLER PO), Take  by mouth., Disp: , Rfl:   •  azithromycin (ZITHROMAX) 250 MG Tab, Take one tab by mouth daily for 4 days., Disp: 4 Tab, Rfl: 0      ALLERGIES  Allergies   Allergen Reactions   • Amoxicillin Anaphylaxis   • Aspirin Anaphylaxis     Hives and tongue swells     • Kiwi Extract Anaphylaxis     Hives and tongue swells.       PHYSICAL EXAM  VITAL SIGNS: /77   Pulse (!) 107   Temp 36.9 °C (98.4 °F)   Resp 16   Ht 1.524 m (5')   Wt 61 kg (134 lb 7.7 oz)   LMP 06/15/2018   SpO2 99%   BMI 26.26 kg/m²    Pulse ox interpretation: On room air, I interpret this pulse ox as normal.  Constitutional: Well developed, well nourished. Lying on stretcher.   HEENT: Normocephalic, atraumatic. Posterior pharynx clear, mucous membranes moist.  Eyes:  EOMI. Normal sclera.  Neck: Supple, Full range of motion, nontender.  Chest/Pulmonary: Clear to ausculation bilaterally, no wheezes or rhonchi.  Cardiovascular: Regular rate and rhythm, no murmur.   Abdomen: Soft, mild suprapubic tenderness, no rebound, guarding, or masses.  : Bimanual exam with RN chaperone Meagan with scant blood, closed os.   Back: No CVA tenderness, nontender midline, no step offs.  Musculoskeletal: No deformity, no edema.  Neuro: Clear speech, normal coordination, cranial nerves II-XII grossly intact.  Psych: Normal mood, slightly anxious affect.  Skin: No rashes, warm and dry.    DIAGNOSTIC STUDIES / PROCEDURES    LABS & EKG  Results for orders placed or performed during the hospital encounter of 10/22/18   CBC with Differential   Result Value Ref Range    WBC 9.8 4.8 - 10.8 K/uL    RBC 3.94 (L) 4.20 - 5.40 M/uL    Hemoglobin 11.6 (L) 12.0 - 16.0 g/dL     Hematocrit 34.3 (L) 37.0 - 47.0 %    MCV 87.1 81.4 - 97.8 fL    MCH 29.4 27.0 - 33.0 pg    MCHC 33.8 33.6 - 35.0 g/dL    RDW 40.1 35.9 - 50.0 fL    Platelet Count 328 164 - 446 K/uL    MPV 10.8 9.0 - 12.9 fL    Neutrophils-Polys 76.90 (H) 44.00 - 72.00 %    Lymphocytes 16.70 (L) 22.00 - 41.00 %    Monocytes 5.30 0.00 - 13.40 %    Eosinophils 0.50 0.00 - 6.90 %    Basophils 0.20 0.00 - 1.80 %    Immature Granulocytes 0.40 0.00 - 0.90 %    Nucleated RBC 0.00 /100 WBC    Neutrophils (Absolute) 7.51 (H) 2.00 - 7.15 K/uL    Lymphs (Absolute) 1.63 1.00 - 4.80 K/uL    Monos (Absolute) 0.52 0.00 - 0.85 K/uL    Eos (Absolute) 0.05 0.00 - 0.51 K/uL    Baso (Absolute) 0.02 0.00 - 0.12 K/uL    Immature Granulocytes (abs) 0.04 0.00 - 0.11 K/uL    NRBC (Absolute) 0.00 K/uL   Comp Metabolic Panel   Result Value Ref Range    Sodium 137 135 - 145 mmol/L    Potassium 3.5 (L) 3.6 - 5.5 mmol/L    Chloride 105 96 - 112 mmol/L    Co2 24 20 - 33 mmol/L    Anion Gap 8.0 0.0 - 11.9    Glucose 86 65 - 99 mg/dL    Bun 7 (L) 8 - 22 mg/dL    Creatinine 0.65 0.50 - 1.40 mg/dL    Calcium 9.3 8.5 - 10.5 mg/dL    AST(SGOT) 17 12 - 45 U/L    ALT(SGPT) 18 2 - 50 U/L    Alkaline Phosphatase 47 30 - 99 U/L    Total Bilirubin 0.5 0.1 - 1.5 mg/dL    Albumin 4.0 3.2 - 4.9 g/dL    Total Protein 7.2 6.0 - 8.2 g/dL    Globulin 3.2 1.9 - 3.5 g/dL    A-G Ratio 1.3 g/dL   ESTIMATED GFR   Result Value Ref Range    GFR If African American >60 >60 mL/min/1.73 m 2    GFR If Non African American >60 >60 mL/min/1.73 m 2   RH TYPE FOR RHOGAM FROM E.D.   Result Value Ref Range    Emergency Department Rh Typing POS     Number Of Rh Doses Indicated ZERO    URINALYSIS,CULTURE IF INDICATED   Result Value Ref Range    Color Yellow     Character Clear     Specific Gravity 1.026 <1.035    Ph 7.0 5.0 - 8.0    Glucose Negative Negative mg/dL    Ketones 40 (A) Negative mg/dL    Protein Negative Negative mg/dL    Bilirubin Negative Negative    Urobilinogen, Urine 1.0 Negative     Nitrite Negative Negative    Leukocyte Esterase Trace (A) Negative    Occult Blood Large (A) Negative    Micro Urine Req Microscopic    URINE MICROSCOPIC (W/UA)   Result Value Ref Range    WBC 2-5 /hpf    RBC  (A) /hpf    Bacteria Negative None /hpf    Epithelial Cells Few /hpf    Hyaline Cast 0-2 /lpf         RADIOLOGY  US-OB 2ND-3RD TRIMESTER TRANSVAGINAL (COMBO)   Final Result      Single live intrauterine pregnancy of an estimated gestational age of Average 14w 5d with an estimated date of delivery of 4/17/2019.      Probable large perigestational hemorrhage.  Follow-up recommended.      Complete fetal survey was not performed.          PROCEDURES  Bedside Ultrasound Procedure Note    Consent: verbal from patient  Indication: bleeding, pregnancy  Probe: curvilinear  Location: tv and long views of lower abd/pelvis, transabdominal view only  Procedure Description/Findings: +fetal movements and heart tones  Impression: viable mclean IUP      COURSE & MEDICAL DECISION MAKING    This is a 22 y.o. female who presents with vaginal bleeding, fairly painless, at 14 weeks pregnancy.     Differential Diagnosis includes but is not limited to:  Threatened miscarriage, ectopic pregnancy, incomplete miscarriage, subchorionic hemorrhage.    ED Course:    9:59 PM The patient was evaluated at bedside. Bedside ultrasound performed. Ordered labs and consultative ultrasound for evaluation. Patient treated with LR bolus with concern for possible dehydration and need to be kept NPO. I informed the patient that she may be having a threatened miscarriage, but she will need to undergo a formal ultrasound and will have to undergo a bimanual exam. The patient understands and agrees to the course of care.    Patient has a normal white blood cell count and a stable hemoglobin and hematocrit, stable platelets as well.  Her electrolytes are stable without an acidosis, urine with strong evidence of infection there are some ketones  concordant with dehydration.  RhoGam not indicated on Rh testing.    Consultative ultrasound shows a mclean live intrauterine pregnancy consistent with my ultrasound that there is a large santy-gestational hemorrhage.  The exact cause is not clear, however given the patient is still less than 15 weeks this is not a viable pregnancy yet unfortunately.  I performed a bimanual examination in her office is closed and she is not having severe bleeding or passage of clot at this time.  Her vital signs are stable, she is feeling better after fluids and less to feel she has had a positive response to parenteral rehydration.  I had a long discussion with the patient and her partner regarding the expected course and possibility of this being an early miscarriage, possible placental abnormality, and that it is not clear yet whether or not this pregnancy will make it to viability.  The patient does have an obstetrician who have asked that she contact in the morning for urgent follow-up, but she will return to the ER immediately for any worsening pain, heavy bleeding, syncope or near syncope, dyspnea, or any other new or worsening symptoms.  I have asked that she not exert herself and practice pelvic rest until she is cleared by her obstetrician.    Medications   lactated ringers infusion (BOLUS) (0 mL Intravenous Stopped 10/22/18 2320)     FINAL IMPRESSION  1. Vaginal bleeding in pregnancy        PRESCRIPTIONS  Discharge Medication List as of 10/23/2018 12:52 AM          FOLLOW UP  [facility], Emergency Dept  [address] [address] [phone]  Today  If symptoms worsen    Your OBGYN    Schedule an appointment as soon as possible for a visit today  for recheck      -DISCHARGE-     The patient is referred to her gynecologist for urgent outpatient f/u.     Pertinent Labs & Imaging studies reviewed and verified by myself, as well as nursing notes and the patient's past medical, family, and  social histories (See chart for details).    Results, exam findings, clinical impression, presumed diagnosis, treatment options, and strict return precautions were discussed with the patient and family, and they verbalized understanding, agreed with, and appreciated the plan of care.    Duglas JOSE (Scribe), am scribing for, and in the presence of, Sage Tucker M.D..    Electronically signed by: Duglas Mukherjee (Ceferinoibe), 10/22/2018    ISage M.D. personally performed the services described in this documentation, as scribed by Duglas Mukherjee in my presence, and it is both accurate and complete. C    Portions of this record were made with voice recognition software.  Despite my review, spelling/grammar/context errors may still remain.  Interpretation of this chart should be taken in this context.    The note accurately reflects work and decisions made by me.  Sage Tucker  10/23/2018  10:36 AM

## 2018-10-23 NOTE — ED NOTES
Went over d/c instructions with pt. Pt verbalized understanding. IV removed. Pt to follow up with OBGYN. Pt ambulated from ED in stable condition along with significant other.

## 2018-10-23 NOTE — ED NOTES
Pt moved from waiting room to ER 66. Pt changing into a gown. Pt states having heavy bleeding and states is on her third pad. See triage note.

## 2018-10-23 NOTE — ED NOTES
KINDER FALL RISK       RISK  Present to ED b/c of fall (syncope, seizure, or ALOC) *no**  Age  >70 **no*  Altered Mental Status (Intoxicated with alcohol or substance confusion, inability to follow instructions, disorientation) *no**  Impaired Mobility (Ambulates or transfers with assistive devices or assist. Ambulates with unsteady gait and no assistance.  Unable to ambulate to transfer.) *no**  Nursing Judgement (Bowel or bladder incontinence, diarrhea, urinary frequency or urgency, leg weakness, orthostatic hypotension, dizziness or vertigo, narcotic use.) **no*    YES TO ANY RISK = HIGH FALL RISK     Interventions in place marked in RED   1. Move patient closer to nurses stations  2. Familiarize the patient with environment  3. Place call light within reach and demonstrate call light use  4. Keep patients personal possessions within patient safe reach (if appropriate)   5. Place stretcher in low position and brakes locked  6.Place yellow socks and armband on patient   7. Place green triangle on patients door  8. Give patient urinal if applicable  9. Keep floor surfaces clean and dry  10.Keep patient care areas uncluttered  11. Use a lap belt or posey vest   12. Assess patient hourly for :Pain, persona needs, position change, and call light access.

## 2018-10-23 NOTE — DISCHARGE INSTRUCTIONS
You were seen and evaluated in the Emergency Department at [facility] for:     Vaginal bleeding in 2nd trimester    You had the following tests and studies:    Ultrasound, blood tests, urine tests. You have some bleeding around your womb, but we don't know exactly why. This needs to be rechecked by your OBGYN, please call them in the morning for very quick recheck.     You received the following medications:    IV fluids.     ----------------------------    Please make sure to follow up with:    Your OBGYN in the morning, but if you get severe pain, heavy bleeding through more than 1 pad per hour for two hours, passing out, trouble breathing, or any other new/worse symptoms please come right back to the ER!    Good luck, we hope you and baby do well!  ----------------------------    We always encourage patients to return IMMEDIATELY if they have:  Increased or changing pain, passing out, fevers over 100.4 (taken in your mouth or rectally) for more than 2 days, redness or swelling of skin or tissues, feeling like your heart is beating fast, chest pain that is new or worsening, trouble breathing, feeling like your throat is closing up and can not breath, inability to walk, weakness of any part of your body, new dizziness, severe bleeding that won't stop from any part of your body, if you can't eat or drink, or if you have any other concerns.   If you feel worse, please know that you can always return with any questions, concerns, worse symptoms, or you are feeling unsafe. We certainly cannot say for sure that we have ruled out every illness or dangerous disease, but we feel that at this specific time, your exam, tests, and vital signs like heart rate and blood pressure are safe for discharge.

## 2018-11-30 ENCOUNTER — APPOINTMENT (OUTPATIENT)
Dept: RADIOLOGY | Facility: MEDICAL CENTER | Age: 23
End: 2018-11-30
Attending: OBSTETRICS & GYNECOLOGY
Payer: MEDICAID

## 2018-12-03 ENCOUNTER — HOSPITAL ENCOUNTER (OUTPATIENT)
Dept: RADIOLOGY | Facility: MEDICAL CENTER | Age: 23
End: 2018-12-03
Attending: OBSTETRICS & GYNECOLOGY
Payer: MEDICAID

## 2018-12-03 DIAGNOSIS — Z34.81 PRENATAL CARE, SUBSEQUENT PREGNANCY, FIRST TRIMESTER: ICD-10-CM

## 2018-12-03 PROCEDURE — 76805 OB US >/= 14 WKS SNGL FETUS: CPT

## 2018-12-26 ENCOUNTER — HOSPITAL ENCOUNTER (OUTPATIENT)
Dept: LAB | Facility: MEDICAL CENTER | Age: 23
End: 2018-12-26
Attending: OBSTETRICS & GYNECOLOGY
Payer: MEDICAID

## 2019-01-08 ENCOUNTER — HOSPITAL ENCOUNTER (OUTPATIENT)
Dept: LAB | Facility: MEDICAL CENTER | Age: 24
End: 2019-01-08
Attending: OBSTETRICS & GYNECOLOGY
Payer: MEDICAID

## 2019-01-08 PROCEDURE — 85018 HEMOGLOBIN: CPT

## 2019-01-08 PROCEDURE — 87389 HIV-1 AG W/HIV-1&-2 AB AG IA: CPT

## 2019-01-08 PROCEDURE — 86901 BLOOD TYPING SEROLOGIC RH(D): CPT

## 2019-01-08 PROCEDURE — 87340 HEPATITIS B SURFACE AG IA: CPT

## 2019-01-08 PROCEDURE — 86762 RUBELLA ANTIBODY: CPT

## 2019-01-08 PROCEDURE — 86900 BLOOD TYPING SEROLOGIC ABO: CPT

## 2019-01-08 PROCEDURE — 85014 HEMATOCRIT: CPT

## 2019-01-08 PROCEDURE — 82950 GLUCOSE TEST: CPT

## 2019-01-08 PROCEDURE — 86803 HEPATITIS C AB TEST: CPT

## 2019-01-08 PROCEDURE — 86850 RBC ANTIBODY SCREEN: CPT

## 2019-01-08 PROCEDURE — 86780 TREPONEMA PALLIDUM: CPT

## 2019-01-09 LAB
ABO GROUP BLD: NORMAL
BLD GP AB SCN SERPL QL: NORMAL
GLUCOSE 1H P 50 G GLC PO SERPL-MCNC: 62 MG/DL (ref 70–139)
HBV SURFACE AG SER QL: NEGATIVE
HCT VFR BLD AUTO: 35.4 % (ref 37–47)
HCV AB SER QL: NEGATIVE
HGB BLD-MCNC: 11 G/DL (ref 12–16)
HIV 1+2 AB+HIV1 P24 AG SERPL QL IA: NON REACTIVE
RH BLD: NORMAL
RUBV AB SER QL: 123.3 IU/ML
TREPONEMA PALLIDUM IGG+IGM AB [PRESENCE] IN SERUM OR PLASMA BY IMMUNOASSAY: NON REACTIVE

## 2019-01-09 PROCEDURE — 86900 BLOOD TYPING SEROLOGIC ABO: CPT

## 2019-01-09 PROCEDURE — 86901 BLOOD TYPING SEROLOGIC RH(D): CPT

## 2019-01-09 PROCEDURE — 86850 RBC ANTIBODY SCREEN: CPT

## 2019-01-21 ENCOUNTER — HOSPITAL ENCOUNTER (OUTPATIENT)
Facility: MEDICAL CENTER | Age: 24
End: 2019-01-22
Attending: OBSTETRICS & GYNECOLOGY | Admitting: OBSTETRICS & GYNECOLOGY
Payer: MEDICAID

## 2019-01-21 VITALS
HEIGHT: 65 IN | RESPIRATION RATE: 18 BRPM | SYSTOLIC BLOOD PRESSURE: 132 MMHG | TEMPERATURE: 97.8 F | BODY MASS INDEX: 24.99 KG/M2 | WEIGHT: 150 LBS | HEART RATE: 91 BPM | DIASTOLIC BLOOD PRESSURE: 86 MMHG

## 2019-01-22 ENCOUNTER — HOSPITAL ENCOUNTER (EMERGENCY)
Facility: MEDICAL CENTER | Age: 24
End: 2019-01-22
Attending: EMERGENCY MEDICINE
Payer: MEDICAID

## 2019-01-22 VITALS
OXYGEN SATURATION: 100 % | RESPIRATION RATE: 18 BRPM | TEMPERATURE: 98.1 F | DIASTOLIC BLOOD PRESSURE: 85 MMHG | SYSTOLIC BLOOD PRESSURE: 125 MMHG | HEART RATE: 101 BPM | BODY MASS INDEX: 24.24 KG/M2 | HEIGHT: 65 IN | WEIGHT: 145.5 LBS

## 2019-01-22 DIAGNOSIS — K08.89 PAIN, DENTAL: ICD-10-CM

## 2019-01-22 PROCEDURE — A9270 NON-COVERED ITEM OR SERVICE: HCPCS | Performed by: EMERGENCY MEDICINE

## 2019-01-22 PROCEDURE — 700102 HCHG RX REV CODE 250 W/ 637 OVERRIDE(OP): Performed by: EMERGENCY MEDICINE

## 2019-01-22 PROCEDURE — 700111 HCHG RX REV CODE 636 W/ 250 OVERRIDE (IP): Performed by: EMERGENCY MEDICINE

## 2019-01-22 PROCEDURE — 99283 EMERGENCY DEPT VISIT LOW MDM: CPT

## 2019-01-22 PROCEDURE — 59025 FETAL NON-STRESS TEST: CPT

## 2019-01-22 RX ORDER — ONDANSETRON 4 MG/1
4 TABLET, ORALLY DISINTEGRATING ORAL ONCE
Status: COMPLETED | OUTPATIENT
Start: 2019-01-22 | End: 2019-01-22

## 2019-01-22 RX ORDER — ONDANSETRON 4 MG/1
TABLET, ORALLY DISINTEGRATING ORAL
Status: DISCONTINUED
Start: 2019-01-22 | End: 2019-01-22

## 2019-01-22 RX ORDER — OXYCODONE HYDROCHLORIDE AND ACETAMINOPHEN 5; 325 MG/1; MG/1
1 TABLET ORAL ONCE
Status: COMPLETED | OUTPATIENT
Start: 2019-01-22 | End: 2019-01-22

## 2019-01-22 RX ADMIN — OXYCODONE AND ACETAMINOPHEN 1 TABLET: 5; 325 TABLET ORAL at 01:04

## 2019-01-22 RX ADMIN — ONDANSETRON 4 MG: 4 TABLET, ORALLY DISINTEGRATING ORAL at 01:05

## 2019-01-22 NOTE — ED TRIAGE NOTES
"Farrah Estrada  23 y.o. female  Chief Complaint   Patient presents with   • Dental Pain     Pt reports \"all over\" mouth pain. Reports 10/10 \"sharp, throbbing\" generalized mouth pain.       Pt amb to triage with steady gait for above complaint. Report's pain intermittently for \"months\" with increase in severity and frequency tonight.  Pt is alert and oriented, speaking in full sentences, follows commands and responds appropriately to questions. NAD. Resp are even and unlabored.  Pt placed in lobby. Pt educated on triage process. Pt encouraged to alert staff for any changes.    "

## 2019-01-22 NOTE — ED NOTES
Farrah Estrada discharged via ambulatory with significant other.  Discharge instructions given and reviewed, patient educated to follow up with dentist, verbalized understanding.  Prescriptions given x 0.  All personal belongings in possession.  No questions at this time.

## 2019-01-22 NOTE — DISCHARGE INSTRUCTIONS
General Instructions:  · If you think you are in labor, time contractions (lying on your left side) from the beginning of one contraction to the beginning of the next contraction for at least one hour.  · Increase fluid intake: you should consume 10-12 8 oz glasses of non-caffeinated fluid per day.  · Report any pressure or burning on urination to your physician.  · Monitor fetal movement: If you notice an absence or decrease in fetal movement, drink a large glass of water and rest on your side.  If there is no increase in movement, call your physician or go to the hospital for further evaluation.  · Report any sudden, sharp abdominal pain.  · Report any bleeding.  Spotting or pinkish discharge is normal after vaginal exam.  You may also spot after sexual intercourse.    Pre-term Labor (<37 weeks):  Call your physician or return to the hospital if:  · You have painless regular contractions more than 4 in one hour.  · Your water breaks (remember time and color).  · You have menstrual-like cramps, a low dull backache or pressure in your pelvis or back.  · Your baby does not move enough to complete the daily kick count (10 movements in 2 hours).  · Your baby moves much less often than on the days before or you have not felt your baby move all day.  · Please review the MEDICATION LIST section of your AFTER VISIT SUMMARY document.  · Take your medication as prescribed      Other Instructions:  Please carefully review your entire AFTER VISIT SUMMARY document for all discharge instructions.

## 2019-01-22 NOTE — ED PROVIDER NOTES
"ED Provider Note    CHIEF COMPLAINT  Chief Complaint   Patient presents with   • Dental Pain     Pt reports \"all over\" mouth pain. Reports 10/10 \"sharp, throbbing\" generalized mouth pain.       HPI  Farrah Estrada is a 23 y.o. female who presents with dental pain.  The patient states she has severe pain in her bilateral lower molars.  She has been taking some clindamycin and she has an appointment with a dentist in the morning.  She is currently 27 weeks pregnant.  She has not had any abdominal pain or vaginal bleeding.  She has not had any fevers.  She states the pain is worse with eating.  She does not have any difficulty with swallowing.  She does not have any facial swelling.    REVIEW OF SYSTEMS  No nausea or vomiting    PHYSICAL EXAM  VITAL SIGNS: /85   Pulse (!) 101   Temp 36.7 °C (98.1 °F) (Temporal)   Resp 18   Ht 1.651 m (5' 5\")   Wt 66 kg (145 lb 8.1 oz)   LMP 06/15/2018   SpO2 100%   BMI 24.21 kg/m²   In general the patient appears uncomfortable but nontoxic  Oral cavity the patient does have pain with percussion to the bilateral lower molars.  There is no gingival fluctuance nor induration.  Facial exam no edema  Neck is supple without adenopathy  Pulmonary chest clear to auscultation bilaterally  Cardiovascular S1-S2 with a slightly tachycardic rate  GI abdomen is gravid but nontender    COURSE & MEDICAL DECISION MAKING  Pertinent Labs & Imaging studies reviewed. (See chart for details)  This a 23-year-old female who presents with dentalgia.  The patient has an appointment with a dentist tomorrow.  I did agree to give the patient 1 Percocet tablet this evening to help her out with acute pain control.  She is aware of the risks of the pain medication with her current pregnancy.  The patient will follow up with a dentist tomorrow as scheduled.  She is already on antibiotics.  And at this time I do not see any clear evidence of infection.    FINAL IMPRESSION  1.  Dentalgia  2.  " Intrauterine pregnancy       Disposition  The patient will be discharged in stable condition      Electronically signed by: Mariano Barone, 1/22/2019 12:50 AM

## 2019-01-22 NOTE — PROGRESS NOTES
"6965- pt is a , 27.2 weeks gestation IUP, with c/o bilateral tooth pain radiating to throat and cheeks. Pt states \"my teeth have been hurting all day, I took some tylenol earlier but it didn't work. It is starting to hurt in my face and my throat to where it hurts to swallow. I have been seeing a dentist and have an appointment with them tomorrow, he told me my wisdom teeth were messed up but he didn't want to do anything about it because I'm pregnant\". Pt mother stated \"she just had me worried because she said she couldn't breathe, and her hands and feet have been swelling off and on and I know that is a sign of pre-eclampsia.\" no c/o LOF, bleeding or DFM. EFM and TOCO placed. Vss. Ice pack given for left side of cheek.   2314- Dr. Parmar called and notified of pt c/o. Received orders for pt to follow up with dentist tomorrow, tylenol OTC for discomfort and can be checked in ER if pain is too bad. Discussed poc with pt, verbalized understanding.   0010- addressed discharge instructions, heat pack given for left cheek. Left off floor with family  "

## 2019-03-21 ENCOUNTER — HOSPITAL ENCOUNTER (OUTPATIENT)
Facility: MEDICAL CENTER | Age: 24
End: 2019-03-21
Attending: OBSTETRICS & GYNECOLOGY | Admitting: OBSTETRICS & GYNECOLOGY
Payer: MEDICAID

## 2019-03-21 VITALS
HEART RATE: 91 BPM | BODY MASS INDEX: 25.33 KG/M2 | HEIGHT: 65 IN | TEMPERATURE: 97.7 F | DIASTOLIC BLOOD PRESSURE: 73 MMHG | WEIGHT: 152 LBS | SYSTOLIC BLOOD PRESSURE: 117 MMHG

## 2019-03-21 LAB
APPEARANCE UR: ABNORMAL
COLOR UR AUTO: ABNORMAL
GLUCOSE UR QL STRIP.AUTO: NEGATIVE MG/DL
KETONES UR QL STRIP.AUTO: ABNORMAL MG/DL
LEUKOCYTE ESTERASE UR QL STRIP.AUTO: NEGATIVE
NITRITE UR QL STRIP.AUTO: NEGATIVE
PH UR STRIP.AUTO: 7 [PH]
PROT UR QL STRIP: 30 MG/DL
RBC UR QL AUTO: NEGATIVE
SP GR UR: 1.02

## 2019-03-21 PROCEDURE — 81002 URINALYSIS NONAUTO W/O SCOPE: CPT

## 2019-03-21 PROCEDURE — 59025 FETAL NON-STRESS TEST: CPT

## 2019-03-21 RX ORDER — SODIUM CHLORIDE, SODIUM LACTATE, POTASSIUM CHLORIDE, CALCIUM CHLORIDE 600; 310; 30; 20 MG/100ML; MG/100ML; MG/100ML; MG/100ML
1000 INJECTION, SOLUTION INTRAVENOUS CONTINUOUS
Status: DISCONTINUED | OUTPATIENT
Start: 2019-03-21 | End: 2019-03-21 | Stop reason: HOSPADM

## 2019-03-21 NOTE — PROGRESS NOTES
"0800 - 22 y/o  EDC 19, EGA 35.5, here to LDA 4 with fob, Terrell. C/O \"stomach ache\" that started around 0600 this morning. Pt denies any N/V/D. Pt does state she feels \"hot and sweaty.\"  VSS at this time. EFM/TOCO applied, Patient states positive FM. Denies vaginal LOF or Bleeding.  0820 - Call placed to Dr Sandhu, updates given. Orders received to obtain a reactive NST and then ok to d/c pt home with labor precautions.   0840 - UC's noted on external toco Q3-6min. SVE - 3/70/-2.   0845 - Call placed to Dr Sandhu, updates given. Per MD, RN to start IV for hydration and collect GBS.   0855 - Per pt, GBS culture was collected on Monday 3/18/19.   0950 - SVE - 3/70/-2. Call placed to Dr Sandhu. Discussed poc. Fetal heart tracing discussed, MD to review.   1000 - Call received back from Dr Sandhu, strip reviewed, per MD ok to d/c pt home.   1010 - Discussed s/s of labor, decreased FM, kick counts, vaginal lof/bleeding and when to return back to LND. Pt verbalized understanding.   1015 - Pt ambulated off unit, stable on feet.   "

## 2019-03-22 LAB — GP B STREP DNA SPEC QL NAA+PROBE: NEGATIVE

## 2019-03-23 ENCOUNTER — HOSPITAL ENCOUNTER (OUTPATIENT)
Facility: MEDICAL CENTER | Age: 24
End: 2019-03-23
Attending: OBSTETRICS & GYNECOLOGY | Admitting: OBSTETRICS & GYNECOLOGY
Payer: MEDICAID

## 2019-03-23 VITALS — WEIGHT: 152 LBS | HEIGHT: 65 IN | TEMPERATURE: 98.2 F | BODY MASS INDEX: 25.33 KG/M2

## 2019-03-23 PROCEDURE — 81002 URINALYSIS NONAUTO W/O SCOPE: CPT

## 2019-03-23 PROCEDURE — 59025 FETAL NON-STRESS TEST: CPT

## 2019-03-23 RX ORDER — VITAMIN A ACETATE, BETA CAROTENE, ASCORBIC ACID, CHOLECALCIFEROL, .ALPHA.-TOCOPHEROL ACETATE, DL-, THIAMINE MONONITRATE, RIBOFLAVIN, NIACINAMIDE, PYRIDOXINE HYDROCHLORIDE, FOLIC ACID, CYANOCOBALAMIN, CALCIUM CARBONATE, FERROUS FUMARATE, ZINC OXIDE, CUPRIC OXIDE 3080; 12; 120; 400; 1; 1.84; 3; 20; 22; 920; 25; 200; 27; 10; 2 [IU]/1; UG/1; MG/1; [IU]/1; MG/1; MG/1; MG/1; MG/1; MG/1; [IU]/1; MG/1; MG/1; MG/1; MG/1; MG/1
1 TABLET, FILM COATED ORAL EVERY MORNING
Status: DISCONTINUED | OUTPATIENT
Start: 2019-03-23 | End: 2019-03-23 | Stop reason: HOSPADM

## 2019-03-23 RX ORDER — ACETAMINOPHEN 325 MG/1
650 TABLET ORAL EVERY 4 HOURS PRN
Status: DISCONTINUED | OUTPATIENT
Start: 2019-03-23 | End: 2019-03-23 | Stop reason: HOSPADM

## 2019-03-23 RX ORDER — DOCUSATE SODIUM 100 MG/1
100 CAPSULE, LIQUID FILLED ORAL 2 TIMES DAILY
Status: DISCONTINUED | OUTPATIENT
Start: 2019-03-23 | End: 2019-03-23 | Stop reason: HOSPADM

## 2019-03-23 RX ORDER — CALCIUM CARBONATE 500 MG/1
1000 TABLET, CHEWABLE ORAL
Status: DISCONTINUED | OUTPATIENT
Start: 2019-03-23 | End: 2019-03-23 | Stop reason: HOSPADM

## 2019-03-23 NOTE — DISCHARGE INSTRUCTIONS
General Instructions:  · If you think you are in labor, time contractions (lying on your left side) from the beginning of one contraction to the beginning of the next contraction for at least one hour.  · Increase fluid intake: you should consume 10-12 8 oz glasses of non-caffeinated fluid per day.  · Report any pressure or burning on urination to your physician.  · Monitor fetal movement: If you notice an absence or decrease in fetal movement, drink a large glass of water and rest on your side.  If there is no increase in movement, call your physician or go to the hospital for further evaluation.  · Report any sudden, sharp abdominal pain.  · Report any bleeding.  Spotting or pinkish discharge is normal after vaginal exam.  You may also spot after sexual intercourse.    Other Instructions:  Please carefully review your entire AFTER VISIT SUMMARY document for all discharge instructions.    Labor Instructions (37 - 39 weeks):  Call your physician or return to hospital if:Pre-term Labor (<37 weeks):  Call your physician or return to the hospital if:  · You have painless regular contractions more than 4 in one hour.  · Your water breaks (remember time and color).  · You have menstrual-like cramps, a low dull backache or pressure in your pelvis or back.  · Your baby does not move enough to complete the daily kick count (10 movements in 2 hours).  · Your baby moves much less often than on the days before or you have not felt your baby move all day.  · Please review the MEDICATION LIST section of your AFTER VISIT SUMMARY document.  · Take your medication as prescribed    · You have regular contractions that get progressively closer, longer and stronger.  · Your water breaks (remember time and color).  · You have bleeding like a period.  · Your baby does not move enough to complete the daily kick counts (10 movements in 2 hours)  · Your baby moves much less often than on the days before or you have not felt your baby move  all day.

## 2019-03-23 NOTE — PROGRESS NOTES
23 y.o.  EDC 19 EGA 36.0 here to LDa3 with FOB Terrell c/o contractions for the last few days gaining in intensity this morning. Pt reports positive fetal movement, denies vaginal bleeding or LOF. Seen here 3/21 SVE then 3/70/-2.     Reactive NST. SVE no change from previous exam.     Report to Dr. Chung. Discharage order received. Labor precautions given.   Pt and FOB verbalized understanding.

## 2019-03-25 LAB
APPEARANCE UR: CLEAR
COLOR UR AUTO: YELLOW
GLUCOSE UR QL STRIP.AUTO: NEGATIVE MG/DL
KETONES UR QL STRIP.AUTO: NEGATIVE MG/DL
LEUKOCYTE ESTERASE UR QL STRIP.AUTO: NEGATIVE
NITRITE UR QL STRIP.AUTO: NEGATIVE
PH UR STRIP.AUTO: 6.5 [PH]
PROT UR QL STRIP: ABNORMAL MG/DL
RBC UR QL AUTO: NEGATIVE
SP GR UR: 1.01

## 2019-03-28 ENCOUNTER — APPOINTMENT (OUTPATIENT)
Dept: RADIOLOGY | Facility: MEDICAL CENTER | Age: 24
End: 2019-03-28
Attending: OBSTETRICS & GYNECOLOGY
Payer: MEDICAID

## 2019-03-28 ENCOUNTER — HOSPITAL ENCOUNTER (OUTPATIENT)
Facility: MEDICAL CENTER | Age: 24
End: 2019-03-28
Attending: OBSTETRICS & GYNECOLOGY | Admitting: OBSTETRICS & GYNECOLOGY
Payer: MEDICAID

## 2019-03-28 VITALS
SYSTOLIC BLOOD PRESSURE: 126 MMHG | BODY MASS INDEX: 25.33 KG/M2 | HEART RATE: 99 BPM | DIASTOLIC BLOOD PRESSURE: 75 MMHG | HEIGHT: 65 IN | WEIGHT: 152 LBS | TEMPERATURE: 98.2 F

## 2019-03-28 PROCEDURE — 59025 FETAL NON-STRESS TEST: CPT | Mod: XU

## 2019-03-28 PROCEDURE — 76819 FETAL BIOPHYS PROFIL W/O NST: CPT

## 2019-03-28 NOTE — PROGRESS NOTES
Patient comes in with complaints of contractions that she feels in her back.  She also states that she feels like her vision is a little blurry.  She feels fetal movement, she denies leaking or bleeding.  SVE is 3/30/-3 posterior.      Dr Twila Bray called.  Tracing reviewed, BPP ordered and done 8/8.  Dr Twila Bray called, patient rechecked, sVE is unchanged.  Patient to be discharged.  Discharge instructions given.  Patient ambulated out.

## 2019-04-03 ENCOUNTER — HOSPITAL ENCOUNTER (OUTPATIENT)
Facility: MEDICAL CENTER | Age: 24
End: 2019-04-03
Attending: OBSTETRICS & GYNECOLOGY | Admitting: OBSTETRICS & GYNECOLOGY
Payer: MEDICAID

## 2019-04-03 VITALS
SYSTOLIC BLOOD PRESSURE: 118 MMHG | HEART RATE: 90 BPM | HEIGHT: 65 IN | WEIGHT: 153 LBS | BODY MASS INDEX: 25.49 KG/M2 | TEMPERATURE: 97.9 F | DIASTOLIC BLOOD PRESSURE: 77 MMHG

## 2019-04-03 LAB
APPEARANCE UR: ABNORMAL
COLOR UR AUTO: ABNORMAL
GLUCOSE UR QL STRIP.AUTO: NEGATIVE MG/DL
KETONES UR QL STRIP.AUTO: NEGATIVE MG/DL
LEUKOCYTE ESTERASE UR QL STRIP.AUTO: ABNORMAL
NITRITE UR QL STRIP.AUTO: NEGATIVE
PH UR STRIP.AUTO: 7.5 [PH]
PROT UR QL STRIP: ABNORMAL MG/DL
RBC UR QL AUTO: NEGATIVE
SP GR UR: 1.01

## 2019-04-03 PROCEDURE — 59025 FETAL NON-STRESS TEST: CPT

## 2019-04-03 PROCEDURE — 81002 URINALYSIS NONAUTO W/O SCOPE: CPT

## 2019-04-03 PROCEDURE — 700111 HCHG RX REV CODE 636 W/ 250 OVERRIDE (IP): Performed by: SPECIALIST

## 2019-04-03 PROCEDURE — 700105 HCHG RX REV CODE 258: Performed by: SPECIALIST

## 2019-04-03 PROCEDURE — 96361 HYDRATE IV INFUSION ADD-ON: CPT

## 2019-04-03 PROCEDURE — 96374 THER/PROPH/DIAG INJ IV PUSH: CPT

## 2019-04-03 RX ORDER — DEXTROSE, SODIUM CHLORIDE, SODIUM LACTATE, POTASSIUM CHLORIDE, AND CALCIUM CHLORIDE 5; .6; .31; .03; .02 G/100ML; G/100ML; G/100ML; G/100ML; G/100ML
INJECTION, SOLUTION INTRAVENOUS CONTINUOUS
Status: DISCONTINUED | OUTPATIENT
Start: 2019-04-03 | End: 2019-04-03 | Stop reason: HOSPADM

## 2019-04-03 RX ORDER — ONDANSETRON 2 MG/ML
4 INJECTION INTRAMUSCULAR; INTRAVENOUS EVERY 4 HOURS PRN
Status: DISCONTINUED | OUTPATIENT
Start: 2019-04-03 | End: 2019-04-03 | Stop reason: HOSPADM

## 2019-04-03 RX ADMIN — SODIUM CHLORIDE, SODIUM LACTATE, POTASSIUM CHLORIDE, CALCIUM CHLORIDE AND DEXTROSE MONOHYDRATE: 5; 600; 310; 30; 20 INJECTION, SOLUTION INTRAVENOUS at 17:39

## 2019-04-03 RX ADMIN — ONDANSETRON 4 MG: 2 INJECTION INTRAMUSCULAR; INTRAVENOUS at 17:39

## 2019-04-04 NOTE — PROGRESS NOTES
presents at 37.4 wk gestation with nausea, vomiting, and diarrhea for two days. Began having nasal congestion and sore throat this morning. Not sure if she is having UCs. Denies LOF or VB; reports FM. Report given to Dr. Chung; orders received  : Pt wants to go home now; orders received from Dr. Chung. Labor precautions provided; instructed to drink plenty of fluids and rest

## 2019-04-08 ENCOUNTER — ANESTHESIA EVENT (OUTPATIENT)
Dept: OBGYN | Facility: MEDICAL CENTER | Age: 24
End: 2019-04-08
Payer: MEDICAID

## 2019-04-08 ENCOUNTER — HOSPITAL ENCOUNTER (INPATIENT)
Facility: MEDICAL CENTER | Age: 24
LOS: 2 days | End: 2019-04-10
Attending: OBSTETRICS & GYNECOLOGY | Admitting: OBSTETRICS & GYNECOLOGY
Payer: MEDICAID

## 2019-04-08 ENCOUNTER — APPOINTMENT (OUTPATIENT)
Dept: RADIOLOGY | Facility: MEDICAL CENTER | Age: 24
End: 2019-04-08
Attending: OBSTETRICS & GYNECOLOGY
Payer: MEDICAID

## 2019-04-08 ENCOUNTER — HOSPITAL ENCOUNTER (OUTPATIENT)
Facility: MEDICAL CENTER | Age: 24
End: 2019-04-08
Attending: OBSTETRICS & GYNECOLOGY | Admitting: OBSTETRICS & GYNECOLOGY
Payer: MEDICAID

## 2019-04-08 ENCOUNTER — ANESTHESIA (OUTPATIENT)
Dept: OBGYN | Facility: MEDICAL CENTER | Age: 24
End: 2019-04-08
Payer: MEDICAID

## 2019-04-08 VITALS
SYSTOLIC BLOOD PRESSURE: 108 MMHG | TEMPERATURE: 97.8 F | DIASTOLIC BLOOD PRESSURE: 74 MMHG | BODY MASS INDEX: 25.49 KG/M2 | HEART RATE: 97 BPM | WEIGHT: 153 LBS | HEIGHT: 65 IN

## 2019-04-08 LAB
BASOPHILS # BLD AUTO: 0.1 % (ref 0–1.8)
BASOPHILS # BLD: 0.01 K/UL (ref 0–0.12)
EOSINOPHIL # BLD AUTO: 0.03 K/UL (ref 0–0.51)
EOSINOPHIL NFR BLD: 0.4 % (ref 0–6.9)
ERYTHROCYTE [DISTWIDTH] IN BLOOD BY AUTOMATED COUNT: 43.7 FL (ref 35.9–50)
HCT VFR BLD AUTO: 31.7 % (ref 37–47)
HGB BLD-MCNC: 9.8 G/DL (ref 12–16)
HOLDING TUBE BB 8507: NORMAL
IMM GRANULOCYTES # BLD AUTO: 0.04 K/UL (ref 0–0.11)
IMM GRANULOCYTES NFR BLD AUTO: 0.5 % (ref 0–0.9)
LYMPHOCYTES # BLD AUTO: 1.15 K/UL (ref 1–4.8)
LYMPHOCYTES NFR BLD: 14.5 % (ref 22–41)
MCH RBC QN AUTO: 24.4 PG (ref 27–33)
MCHC RBC AUTO-ENTMCNC: 30.9 G/DL (ref 33.6–35)
MCV RBC AUTO: 79.1 FL (ref 81.4–97.8)
MONOCYTES # BLD AUTO: 0.63 K/UL (ref 0–0.85)
MONOCYTES NFR BLD AUTO: 7.9 % (ref 0–13.4)
NEUTROPHILS # BLD AUTO: 6.08 K/UL (ref 2–7.15)
NEUTROPHILS NFR BLD: 76.6 % (ref 44–72)
NRBC # BLD AUTO: 0 K/UL
NRBC BLD-RTO: 0 /100 WBC
PLATELET # BLD AUTO: 333 K/UL (ref 164–446)
PMV BLD AUTO: 10.7 FL (ref 9–12.9)
RBC # BLD AUTO: 4.01 M/UL (ref 4.2–5.4)
WBC # BLD AUTO: 7.9 K/UL (ref 4.8–10.8)

## 2019-04-08 PROCEDURE — 85025 COMPLETE CBC W/AUTO DIFF WBC: CPT

## 2019-04-08 PROCEDURE — 700111 HCHG RX REV CODE 636 W/ 250 OVERRIDE (IP)

## 2019-04-08 PROCEDURE — 76819 FETAL BIOPHYS PROFIL W/O NST: CPT

## 2019-04-08 PROCEDURE — 304965 HCHG RECOVERY SERVICES

## 2019-04-08 PROCEDURE — A9270 NON-COVERED ITEM OR SERVICE: HCPCS | Performed by: OBSTETRICS & GYNECOLOGY

## 2019-04-08 PROCEDURE — 770002 HCHG ROOM/CARE - OB PRIVATE (112)

## 2019-04-08 PROCEDURE — 700102 HCHG RX REV CODE 250 W/ 637 OVERRIDE(OP): Performed by: OBSTETRICS & GYNECOLOGY

## 2019-04-08 PROCEDURE — 303615 HCHG EPIDURAL/SPINAL ANESTHESIA FOR LABOR

## 2019-04-08 PROCEDURE — 59409 OBSTETRICAL CARE: CPT

## 2019-04-08 PROCEDURE — 59025 FETAL NON-STRESS TEST: CPT | Mod: XU

## 2019-04-08 PROCEDURE — 700111 HCHG RX REV CODE 636 W/ 250 OVERRIDE (IP): Performed by: OBSTETRICS & GYNECOLOGY

## 2019-04-08 PROCEDURE — 700105 HCHG RX REV CODE 258

## 2019-04-08 RX ORDER — ONDANSETRON 4 MG/1
4 TABLET, ORALLY DISINTEGRATING ORAL EVERY 6 HOURS PRN
Status: DISCONTINUED | OUTPATIENT
Start: 2019-04-08 | End: 2019-04-10 | Stop reason: HOSPADM

## 2019-04-08 RX ORDER — OXYCODONE HYDROCHLORIDE AND ACETAMINOPHEN 5; 325 MG/1; MG/1
2 TABLET ORAL EVERY 4 HOURS PRN
Status: DISCONTINUED | OUTPATIENT
Start: 2019-04-08 | End: 2019-04-10 | Stop reason: HOSPADM

## 2019-04-08 RX ORDER — ONDANSETRON 2 MG/ML
4 INJECTION INTRAMUSCULAR; INTRAVENOUS EVERY 6 HOURS PRN
Status: DISCONTINUED | OUTPATIENT
Start: 2019-04-08 | End: 2019-04-10 | Stop reason: HOSPADM

## 2019-04-08 RX ORDER — SODIUM CHLORIDE, SODIUM LACTATE, POTASSIUM CHLORIDE, CALCIUM CHLORIDE 600; 310; 30; 20 MG/100ML; MG/100ML; MG/100ML; MG/100ML
INJECTION, SOLUTION INTRAVENOUS
Status: COMPLETED
Start: 2019-04-08 | End: 2019-04-08

## 2019-04-08 RX ORDER — SODIUM CHLORIDE, SODIUM LACTATE, POTASSIUM CHLORIDE, CALCIUM CHLORIDE 600; 310; 30; 20 MG/100ML; MG/100ML; MG/100ML; MG/100ML
INJECTION, SOLUTION INTRAVENOUS PRN
Status: DISCONTINUED | OUTPATIENT
Start: 2019-04-08 | End: 2019-04-10 | Stop reason: HOSPADM

## 2019-04-08 RX ORDER — BUPIVACAINE HCL/0.9 % NACL/PF 0.125 %
PLASTIC BAG, INJECTION (ML) EPIDURAL PRN
Status: DISCONTINUED | OUTPATIENT
Start: 2019-04-08 | End: 2019-04-08 | Stop reason: SURG

## 2019-04-08 RX ORDER — ROPIVACAINE HYDROCHLORIDE 2 MG/ML
INJECTION, SOLUTION EPIDURAL; INFILTRATION; PERINEURAL CONTINUOUS
Status: DISCONTINUED | OUTPATIENT
Start: 2019-04-08 | End: 2019-04-10 | Stop reason: HOSPADM

## 2019-04-08 RX ORDER — IBUPROFEN 600 MG/1
600 TABLET ORAL EVERY 6 HOURS PRN
Status: DISCONTINUED | OUTPATIENT
Start: 2019-04-08 | End: 2019-04-10 | Stop reason: HOSPADM

## 2019-04-08 RX ORDER — DOCUSATE SODIUM 100 MG/1
100 CAPSULE, LIQUID FILLED ORAL 2 TIMES DAILY PRN
Status: DISCONTINUED | OUTPATIENT
Start: 2019-04-08 | End: 2019-04-10 | Stop reason: HOSPADM

## 2019-04-08 RX ORDER — SODIUM CHLORIDE, SODIUM LACTATE, POTASSIUM CHLORIDE, CALCIUM CHLORIDE 600; 310; 30; 20 MG/100ML; MG/100ML; MG/100ML; MG/100ML
INJECTION, SOLUTION INTRAVENOUS
Status: ACTIVE
Start: 2019-04-08 | End: 2019-04-09

## 2019-04-08 RX ORDER — SODIUM CHLORIDE, SODIUM LACTATE, POTASSIUM CHLORIDE, AND CALCIUM CHLORIDE .6; .31; .03; .02 G/100ML; G/100ML; G/100ML; G/100ML
250 INJECTION, SOLUTION INTRAVENOUS PRN
Status: DISCONTINUED | OUTPATIENT
Start: 2019-04-08 | End: 2019-04-08 | Stop reason: HOSPADM

## 2019-04-08 RX ORDER — ROPIVACAINE HYDROCHLORIDE 2 MG/ML
INJECTION, SOLUTION EPIDURAL; INFILTRATION; PERINEURAL
Status: COMPLETED
Start: 2019-04-08 | End: 2019-04-08

## 2019-04-08 RX ORDER — OXYCODONE HYDROCHLORIDE AND ACETAMINOPHEN 5; 325 MG/1; MG/1
1 TABLET ORAL EVERY 4 HOURS PRN
Status: DISCONTINUED | OUTPATIENT
Start: 2019-04-08 | End: 2019-04-10 | Stop reason: HOSPADM

## 2019-04-08 RX ORDER — SODIUM CHLORIDE, SODIUM LACTATE, POTASSIUM CHLORIDE, AND CALCIUM CHLORIDE .6; .31; .03; .02 G/100ML; G/100ML; G/100ML; G/100ML
1000 INJECTION, SOLUTION INTRAVENOUS
Status: DISCONTINUED | OUTPATIENT
Start: 2019-04-08 | End: 2019-04-08 | Stop reason: HOSPADM

## 2019-04-08 RX ORDER — MISOPROSTOL 200 UG/1
800 TABLET ORAL
Status: DISCONTINUED | OUTPATIENT
Start: 2019-04-08 | End: 2019-04-10 | Stop reason: HOSPADM

## 2019-04-08 RX ADMIN — ONDANSETRON 4 MG: 2 INJECTION INTRAMUSCULAR; INTRAVENOUS at 19:51

## 2019-04-08 RX ADMIN — Medication 2000 ML/HR: at 20:10

## 2019-04-08 RX ADMIN — SODIUM CHLORIDE, POTASSIUM CHLORIDE, SODIUM LACTATE AND CALCIUM CHLORIDE: 600; 310; 30; 20 INJECTION, SOLUTION INTRAVENOUS at 16:15

## 2019-04-08 RX ADMIN — OXYCODONE HYDROCHLORIDE AND ACETAMINOPHEN 1 TABLET: 5; 325 TABLET ORAL at 23:40

## 2019-04-08 RX ADMIN — IBUPROFEN 600 MG: 600 TABLET ORAL at 23:40

## 2019-04-08 RX ADMIN — Medication 10 ML: at 17:06

## 2019-04-08 RX ADMIN — ROPIVACAINE HYDROCHLORIDE: 2 INJECTION, SOLUTION EPIDURAL; INFILTRATION at 17:06

## 2019-04-08 RX ADMIN — ROPIVACAINE HYDROCHLORIDE: 2 INJECTION, SOLUTION EPIDURAL; INFILTRATION; PERINEURAL at 17:06

## 2019-04-08 RX ADMIN — Medication 125 ML/HR: at 21:07

## 2019-04-08 ASSESSMENT — PATIENT HEALTH QUESTIONNAIRE - PHQ9
1. LITTLE INTEREST OR PLEASURE IN DOING THINGS: NOT AT ALL
SUM OF ALL RESPONSES TO PHQ9 QUESTIONS 1 AND 2: 0
2. FEELING DOWN, DEPRESSED, IRRITABLE, OR HOPELESS: NOT AT ALL

## 2019-04-08 ASSESSMENT — LIFESTYLE VARIABLES
ALCOHOL_USE: NO
EVER_SMOKED: NEVER

## 2019-04-08 ASSESSMENT — PAIN SCALES - GENERAL: PAIN_LEVEL: 0

## 2019-04-08 NOTE — PROGRESS NOTES
Pt arrived to LND c/o increased UC strength q5mins. Pt breathing through UC and unable to speak through them. Pt placed on external monitors x2. FOB at bedside for support. SVE 6-7/100/-1 BBOW. Dr Twila Bray phoned and updated on pt status. Orders received for admission. Room set up for delivery. PIV started and IVF bolus started for epidural per pt request.

## 2019-04-08 NOTE — PROGRESS NOTES
presents at 38.2 wk gestation for UCs q 4-5 min. Denies LOF or VB; reports FM. SVE 4-5/70/-2 but pt appears comfortable and UCs palpate mild. Water provided  0820: Repositioned on left side  0840: Report given to Dr. Burnett; orders to continue monitoring and recheck cervix in hour. If no change and still not able to get NST; will order BPP  0930: SVE 5/70/-2; pt appears comfortable still and not armando regularly. Dr. Burnett notified; orders for BPP and to have pt ambulate if   1000: U/s tech at bedside  1030: BPP ; ambulating for hour  1130: SVE 5/70/-2; RN can stretch her to 6 cm but pt is playing on phone and not armando much anymore; only had one UC in 15 min. Dr. Burnett updated; d/c orders obtained. Labor precautions provided. Encouraged to hydrate and rest and to return when UCs are closer and stronger

## 2019-04-08 NOTE — ANESTHESIA PREPROCEDURE EVALUATION
Relevant Problems   No relevant active problems       Physical Exam    Airway   Mallampati: I  TM distance: >3 FB  Neck ROM: full       Cardiovascular - normal exam     Dental - normal exam         Pulmonary   Breath sounds clear to auscultation     Abdominal     Comments: gravid   Neurological              Anesthesia Plan    ASA 1       Plan - epidural   Neuraxial block will be labor analgesia              Pertinent diagnostic labs and testing reviewed    Informed Consent:    Anesthetic plan and risks discussed with patient.

## 2019-04-09 LAB
ERYTHROCYTE [DISTWIDTH] IN BLOOD BY AUTOMATED COUNT: 44.4 FL (ref 35.9–50)
HCT VFR BLD AUTO: 33.6 % (ref 37–47)
HGB BLD-MCNC: 9.8 G/DL (ref 12–16)
MCH RBC QN AUTO: 23.7 PG (ref 27–33)
MCHC RBC AUTO-ENTMCNC: 29.2 G/DL (ref 33.6–35)
MCV RBC AUTO: 81.4 FL (ref 81.4–97.8)
PLATELET # BLD AUTO: 239 K/UL (ref 164–446)
PMV BLD AUTO: 10.1 FL (ref 9–12.9)
RBC # BLD AUTO: 4.13 M/UL (ref 4.2–5.4)
WBC # BLD AUTO: 11.2 K/UL (ref 4.8–10.8)

## 2019-04-09 PROCEDURE — A9270 NON-COVERED ITEM OR SERVICE: HCPCS | Performed by: OBSTETRICS & GYNECOLOGY

## 2019-04-09 PROCEDURE — 770002 HCHG ROOM/CARE - OB PRIVATE (112)

## 2019-04-09 PROCEDURE — 36415 COLL VENOUS BLD VENIPUNCTURE: CPT

## 2019-04-09 PROCEDURE — 700102 HCHG RX REV CODE 250 W/ 637 OVERRIDE(OP): Performed by: OBSTETRICS & GYNECOLOGY

## 2019-04-09 PROCEDURE — 85027 COMPLETE CBC AUTOMATED: CPT

## 2019-04-09 RX ADMIN — IBUPROFEN 600 MG: 600 TABLET ORAL at 15:50

## 2019-04-09 RX ADMIN — OXYCODONE HYDROCHLORIDE AND ACETAMINOPHEN 1 TABLET: 5; 325 TABLET ORAL at 10:10

## 2019-04-09 RX ADMIN — OXYCODONE HYDROCHLORIDE AND ACETAMINOPHEN 1 TABLET: 5; 325 TABLET ORAL at 23:44

## 2019-04-09 RX ADMIN — IBUPROFEN 600 MG: 600 TABLET ORAL at 09:44

## 2019-04-09 RX ADMIN — OXYCODONE HYDROCHLORIDE AND ACETAMINOPHEN 1 TABLET: 5; 325 TABLET ORAL at 16:50

## 2019-04-09 RX ADMIN — IBUPROFEN 600 MG: 600 TABLET ORAL at 23:44

## 2019-04-09 ASSESSMENT — EDINBURGH POSTNATAL DEPRESSION SCALE (EPDS)
THE THOUGHT OF HARMING MYSELF HAS OCCURRED TO ME: NEVER
I HAVE BEEN ANXIOUS OR WORRIED FOR NO GOOD REASON: NO, NOT AT ALL
I HAVE BEEN SO UNHAPPY THAT I HAVE BEEN CRYING: NO, NEVER
I HAVE BEEN ABLE TO LAUGH AND SEE THE FUNNY SIDE OF THINGS: AS MUCH AS I ALWAYS COULD
I HAVE LOOKED FORWARD WITH ENJOYMENT TO THINGS: AS MUCH AS I EVER DID
I HAVE BLAMED MYSELF UNNECESSARILY WHEN THINGS WENT WRONG: NO, NEVER
I HAVE FELT SAD OR MISERABLE: NO, NOT AT ALL
I HAVE BEEN SO UNHAPPY THAT I HAVE HAD DIFFICULTY SLEEPING: NOT AT ALL
I HAVE FELT SCARED OR PANICKY FOR NO GOOD REASON: NO, NOT AT ALL
THINGS HAVE BEEN GETTING ON TOP OF ME: NO, I HAVE BEEN COPING AS WELL AS EVER

## 2019-04-09 NOTE — PROGRESS NOTES
Dr. Twila Aburto notified that patient's baby not being discharged until tomorrow. Discharge ordered to be held until tomorrow.

## 2019-04-09 NOTE — ANESTHESIA QCDR
2019 [facility] Clinical Data Registry (for Quality Improvement)     Postoperative nausea/vomiting risk protocol (Adult = 18 yrs and Pediatric 3-17 yrs)- (430 and 463)  General inhalation anesthetic (NOT TIVA) with PONV risk factors: No  Provision of anti-emetic therapy with at least 2 different classes of agents: N/A  Patient DID NOT receive anti-emetic therapy and reason is documented in Medical Record: N/A    Multimodal Pain Management- (AQI59)  Patient undergoing Elective Surgery (i.e. Outpatient, or ASC, or Prescheduled Surgery prior to Hospital Admission): No  Use of Multimodal Pain Management, two or more drugs and/or interventions, NOT including systemic opioids: N/A  Exception: Documented allergy to multiple classes of analgesics: N/A    PACU assessment of acute postoperative pain prior to Anesthesia Care End- Applies to Patients Age = 18- (ABG7)  Initial PACU pain score is which of the following: < 7/10  Patient unable to report pain score: N/A    Post-anesthetic transfer of care checklist/protocol to PACU/ICU- (426 and 427)  Upon conclusion of case, patient transferred to which of the following locations: PACU/Non-ICU  Use of transfer checklist/protocol: Yes  Exclusion: Service Performed in Patient Hospital Room (and thus did not require transfer): N/A    PACU Reintubation- (AQI31)  General anesthesia requiring endotracheal intubation (ETT) along with subsequent extubation in OR or PACU: No  Required reintubation in the PACU: N/A  Extubation was a planned trial documented in the medical record prior to removal of the original airway device: N/A    Unplanned admission to ICU related to anesthesia service up through end of PACU care- (MD51)  Unplanned admission to ICU (not initially anticipated at anesthesia start time): No

## 2019-04-09 NOTE — ANESTHESIA POSTPROCEDURE EVALUATION
Patient: Farrah Estrada    Procedure Summary     Date:  04/08/19 Room / Location:      Anesthesia Start:  1651 Anesthesia Stop:  2005    Procedure:  Labor Epidural Diagnosis:      Scheduled Providers:   Responsible Provider:  Stephanie Hawkins M.D.    Anesthesia Type:  epidural ASA Status:  1          Final Anesthesia Type: epidural  Last vitals  BP   Blood Pressure: 109/65    Temp   36.6 °C (97.8 °F)    Pulse   Pulse: 82   Resp   20    SpO2   99 %      Anesthesia Post Evaluation    Patient location during evaluation: bedside  Patient participation: complete - patient participated  Pain score: 0    Airway patency: patent  Anesthetic complications: no  Cardiovascular status: adequate  Respiratory status: acceptable  Hydration status: acceptable    PONV: none

## 2019-04-09 NOTE — ANESTHESIA PROCEDURE NOTES
Epidural Block  Performed by: ELLE VEGA  Authorized by: ELLE VEGA     Patient Location:  OB  Start Time:  4/8/2019 5:06 PM  End Time:  4/8/2019 5:14 PM  Reason for Block: labor analgesia    patient identified, IV checked, site marked, risks and benefits discussed, surgical consent, monitors and equipment checked, pre-op evaluation and timeout performed    Patient Position:  Sitting  Prep: ChloraPrep, patient draped and sterile technique    Monitoring:  Blood pressure, continuous pulse oximetry and heart rate  Approach:  Midline  Location:  L3-L4  Injection Technique:  CARLOS MANUEL saline  Skin infiltration:  Lidocaine  Strength:  1%  Dose:  3ml  Needle Type:  Tuohy  Needle Gauge:  17 G  Needle Length:  3.5 in  Loss of resistance::  4  Catheter Size:  19 G  Catheter at Skin Depth:  10  Test Dose:  Lidocaine 1.5% with epinephrine 1-to-200,000  Test Dose Result:  Negative

## 2019-04-09 NOTE — CARE PLAN
"Problem: Pain  Goal: Alleviation of Pain or a reduction in pain to the patient's comfort goal  Outcome: PROGRESSING AS EXPECTED  Epidural infusing per MAR. Pt denies any pain at this time and states she is \"comfortable\" with epidural. Pt educated on how to use bolus button if UC become more severe.     Problem: Risk for Infection, Impaired Wound Healing  Goal: Remain free from signs and symptoms of infection  Outcome: PROGRESSING AS EXPECTED  Afebrile. Fluids clear and free from odor. Abdomen non-tender.       "

## 2019-04-09 NOTE — PROGRESS NOTES
Report from ISAIAS White RN. Pt attempted to go to bathroom, but right leg is slightly numb. Able to void on bedpan. Transferred to wheelchair. Report given to Cindy post-partum TR

## 2019-04-09 NOTE — PROGRESS NOTES
Report given to TR Sanchez. Pt resting on R side. FHT audible in the 140s but EFM showing HR in the 70s. Pt c/o R hip pain. Epidural bolus button hit for 2nd time.

## 2019-04-09 NOTE — PROGRESS NOTES
"1900- Bedside report from TANYA Rutledge RN. Pt resting, epidural infusing    2005-  by Dr. Twila Bray of viable female, \"Madeleine\"    Report to TANYA Mcintosh RN  "

## 2019-04-09 NOTE — PROGRESS NOTES
Patient arrived to room 314 at 2230. Report received from TR Drake. Educated patient on cuddles system, emergency pull cord, and skylight educational videos. Call light within reach.

## 2019-04-09 NOTE — CARE PLAN
Problem: Infection  Goal: Will remain free from infection  Outcome: PROGRESSING AS EXPECTED  Pt afebrile. No s/s infection.    Problem: Pain Management  Goal: Pain level will decrease to patient's comfort goal  Outcome: PROGRESSING AS EXPECTED  Pt c/o abd and back pain. Ibuprofen 600mg PRN Q6 and Percocet 5-10 mg PRN Q4 given and pain moderately controlled.Warm packs applied and pain mildly relieved.

## 2019-04-09 NOTE — DISCHARGE SUMMARY
Discharge Summary:      Farrah Estrada      Admit Date:   2019  Discharge Date:  2019     Admitting diagnosis:  Pregnancy  Labor and delivery indication for care or intervention  Discharge Diagnosis: Status post vaginal, spontaneous.  Pregnancy Complications:  Tubal Ligation:  no        History:  Past Medical History:   Diagnosis Date   • Known health problems: none      OB History    Para Term  AB Living   4 2 2     2   SAB TAB Ectopic Molar Multiple Live Births           0 2      # Outcome Date GA Lbr Jamarcus/2nd Weight Sex Delivery Anes PTL Lv   4 Term 19 38w2d / 00:37 2.48 kg (5 lb 7.5 oz) F Vag-Spont EPI N MATT   3  2014     TAB      2  2013     SAB      1 Term      Vag-Spont   MATT           Amoxicillin; Aspirin; and Kiwi extract  There are no active problems to display for this patient.       Hospital Course:   23 y.o. , now para 2, was admitted with the above mentioned diagnosis, underwent Active Labor, vaginal, spontaneous. Patient postpartum course was unremarkable, with progressive advancement in diet , ambulation and toleration of oral analgesia. Patient without complaints today and desires discharge.      Vitals:    19 2030 19 2230 19 0230 19 0600   BP: 124/78 118/77 116/80 126/86   Pulse: 96 92 80 85   Resp:  18 18 18   Temp:  36.6 °C (97.8 °F) 36.1 °C (97 °F) 36.2 °C (97.1 °F)   TempSrc:  Temporal Temporal Temporal   SpO2:  97% 96% 99%   Weight:       Height:           Current Facility-Administered Medications   Medication Dose   • ondansetron (ZOFRAN ODT) dispertab 4 mg  4 mg    Or   • ondansetron (ZOFRAN) syringe/vial injection 4 mg  4 mg   • oxytocin (PITOCIN) infusion (for postpartum)   mL/hr   • ibuprofen (MOTRIN) tablet 600 mg  600 mg   • oxyCODONE-acetaminophen (PERCOCET) 5-325 MG per tablet 1 Tab  1 Tab   • oxyCODONE-acetaminophen (PERCOCET) 5-325 MG per tablet 2 Tab  2 Tab   • ropivacaine (NAROPIN)  injection     • LR infusion     • PRN oxytocin (PITOCIN) (20 Units/1000 mL) PRN for excessive uterine bleeding - See Admin Instr  125-999 mL/hr   • miSOPROStol (CYTOTEC) tablet 800 mcg  800 mcg   • docusate sodium (COLACE) capsule 100 mg  100 mg       Exam:  Breast Exam: negative  Abdomen: Abdomen soft, non-tender. BS normal. No masses,  No organomegaly  Fundus Non Tender: yes  Perineum: perineum intact  Extremity: extremities, peripheral pulses and reflexes normal     Labs:  Recent Labs      04/08/19   1618  04/09/19   0510   WBC  7.9  11.2*   RBC  4.01*  4.13*   HEMOGLOBIN  9.8*  9.8*   HEMATOCRIT  31.7*  33.6*   MCV  79.1*  81.4   MCH  24.4*  23.7*   MCHC  30.9*  29.2*   RDW  43.7  44.4   PLATELETCT  333  239   MPV  10.7  10.1        Activity:   Discharge to home  Pelvic Rest x 6 weeks    Assessment:  normal postpartum course  Discharge Assessment: No heavy bleeding or foul vaginal discharge      Follow up: Dr Parmar 6 weeks     Discharge Meds:   No current outpatient prescriptions on file.       Katarina Kumar M.D.

## 2019-04-09 NOTE — L&D DELIVERY NOTE
DELIVERY NOTE: 2019    Admitting Diagnosis:  23 year old  38 weeks and 2 days active labor.   GBS negative   She progressed uneventfully in labor and delivered via  at   over intact perineum under epidural anesthesia  to a LBF in OA presentation with BW pending  AS .  Good suctioning of the nose and mouth was done, cord clamped and cut and baby  handed off to the RN in attendance of further care.   Clear AF.  Placenta was delivered spontaneously at  complete and intact with 3 vessel cord.   Estimated Blood loss- 200 cc  Uterus noted to be firm and contracted immediately postpartum.  No other cervical nor vaginal lacerations noted.  Patient tolerated the procedure well. No complications encountered.  Both patient and baby are in good condition.

## 2019-04-09 NOTE — H&P
History and Physical      Farrah Estrada is a 23 y.o. female  at 38w2d who presents for contractions     Subjective:   positive  For CTXS.   positive Feels pain   negative for LOF  negative for vaginal bleeding.   positive for fetal movement    ROS: A comprehensive review of systems was negative.    Past Medical History:   Diagnosis Date   • Known health problems: none      History reviewed. No pertinent surgical history.  OB History    Para Term  AB Living   4 1 1     1   SAB TAB Ectopic Molar Multiple Live Births             1      # Outcome Date GA Lbr Jamarcus/2nd Weight Sex Delivery Anes PTL Lv   4 Current            3  2014     TAB      2  2013     SAB      1 Term      Vag-Spont   MATT        Social History     Social History   • Marital status: Single     Spouse name: N/A   • Number of children: N/A   • Years of education: N/A     Occupational History   • Not on file.     Social History Main Topics   • Smoking status: Never Smoker   • Smokeless tobacco: Never Used   • Alcohol use No   • Drug use: No   • Sexual activity: Not on file     Other Topics Concern   • Not on file     Social History Narrative   • No narrative on file     Allergies: Amoxicillin; Aspirin; and Kiwi extract    Current Facility-Administered Medications:   •  LACTATED RINGERS IV SOLN, , , ,   •  fentaNYL (SUBLIMAZE) injection 50 mcg, 50 mcg, Intravenous, Q HOUR PRN, Katarina Kumar M.D.  •  fentaNYL (SUBLIMAZE) injection 100 mcg, 100 mcg, Intravenous, Q HOUR PRN, Katarina Kumar M.D.  •  LACTATED RINGERS IV SOLN, , , ,   •  lactated ringers (BOLUS) infusion, 1,000 mL, Intravenous, Once PRN, Stephanie Hawkins M.D.  •  ropivacaine (NAROPIN) injection, , Epidural, Continuous, Stephanie Hawkins M.D.  •  ePHEDrine injection 5 mg, 5 mg, Intravenous, Q5 MIN PRN **AND** Notify Anesthesiologist if ephedrine given and for sustained hypotension (more than 2 minutes), , , Once  "**AND** For Hypotension: Place patient in left lateral tilt to achieve uterine displacement and elevate legs., , , PRN **AND** Hypotension: Oxygen Continuous, , , CONTINUOUS **AND** lactated ringers infusion (BOLUS), 250 mL, Intravenous, PRN, Stephanie Hawkins M.D.    Facility-Administered Medications Ordered in Other Encounters:   •  Bupivacaine HCl-NaCl 0.125-0.9 % SOLN, , Epidural, PRN, Stephanie Hawkins M.D., 10 mL at 04/08/19 1706    Prenatal care with Dr Parmar   There are no active problems to display for this patient.    Objective:      /72   Pulse 86   Temp 36.4 °C (97.6 °F) (Temporal)   Resp 20   Ht 1.651 m (5' 5\")   Wt 69.4 kg (153 lb)   SpO2 99%     General:   no acute distress, alert, cooperative   Skin:   normal   HEENT:  Sclera clear, anicteric   Lungs:   CTA bilateral   Heart:   S1, S2 normal, no murmur, click, rub or gallop, regular rate and rhythm   Abdomen:   gravid, NT   EFW:  3000   Pelvis:  adequate with gynecoid pelvis   FHT:  140 BPM   Uterine Size: S=D   Presentations: Cephalic   Cervix:     Dilation: 7    Effacement: 75%    Station:  -1    Consistency: Soft    Position: Posterior     Lab Review  Lab:   Blood type: B     Recent Results (from the past 5880 hour(s))   URINALYSIS,CULTURE IF INDICATED    Collection Time: 08/23/18 11:15 AM   Result Value Ref Range    Color Yellow     Character Clear     Specific Gravity 1.025 <1.035    Ph 6.0 5.0 - 8.0    Glucose Negative Negative mg/dL    Ketones Negative Negative mg/dL    Protein Negative Negative mg/dL    Bilirubin Negative Negative    Nitrite Negative Negative    Leukocyte Esterase Negative Negative    Occult Blood Negative Negative    Micro Urine Req see below    CBC WITH DIFFERENTIAL    Collection Time: 08/23/18 11:15 AM   Result Value Ref Range    WBC 6.2 4.8 - 10.8 K/uL    RBC 4.19 (L) 4.20 - 5.40 M/uL    Hemoglobin 11.9 (L) 12.0 - 16.0 g/dL    Hematocrit 36.1 (L) 37.0 - 47.0 %    MCV 86.2 81.4 - 97.8 fL    MCH 28.4 27.0 - 33.0 pg "    MCHC 33.0 (L) 33.6 - 35.0 g/dL    RDW 39.8 35.9 - 50.0 fL    Platelet Count 260 164 - 446 K/uL    MPV 10.8 9.0 - 12.9 fL    Neutrophils-Polys 63.30 44.00 - 72.00 %    Lymphocytes 29.40 22.00 - 41.00 %    Monocytes 5.50 0.00 - 13.40 %    Eosinophils 0.80 0.00 - 6.90 %    Basophils 0.50 0.00 - 1.80 %    Immature Granulocytes 0.50 0.00 - 0.90 %    Nucleated RBC 0.00 /100 WBC    Neutrophils (Absolute) 3.91 2.00 - 7.15 K/uL    Lymphs (Absolute) 1.82 1.00 - 4.80 K/uL    Monos (Absolute) 0.34 0.00 - 0.85 K/uL    Eos (Absolute) 0.05 0.00 - 0.51 K/uL    Baso (Absolute) 0.03 0.00 - 0.12 K/uL    Immature Granulocytes (abs) 0.03 0.00 - 0.11 K/uL    NRBC (Absolute) 0.00 K/uL   COMP METABOLIC PANEL    Collection Time: 08/23/18 11:15 AM   Result Value Ref Range    Sodium 134 (L) 135 - 145 mmol/L    Potassium 3.4 (L) 3.6 - 5.5 mmol/L    Chloride 109 96 - 112 mmol/L    Co2 20 20 - 33 mmol/L    Anion Gap 5.0 0.0 - 11.9    Glucose 79 65 - 99 mg/dL    Bun 5 (L) 8 - 22 mg/dL    Creatinine 0.53 0.50 - 1.40 mg/dL    Calcium 8.7 8.4 - 10.2 mg/dL    AST(SGOT) 18 12 - 45 U/L    ALT(SGPT) 12 2 - 50 U/L    Alkaline Phosphatase 48 30 - 99 U/L    Total Bilirubin 0.7 0.1 - 1.5 mg/dL    Albumin 4.2 3.2 - 4.9 g/dL    Total Protein 7.2 6.0 - 8.2 g/dL    Globulin 3.0 1.9 - 3.5 g/dL    A-G Ratio 1.4 g/dL   LIPASE    Collection Time: 08/23/18 11:15 AM   Result Value Ref Range    Lipase 28 7 - 58 U/L   HCG QUANTITATIVE SERUM    Collection Time: 08/23/18 11:15 AM   Result Value Ref Range    Bhcg 64214.0 (H) 0.0 - 10.0 mIU/mL   ESTIMATED GFR    Collection Time: 08/23/18 11:15 AM   Result Value Ref Range    GFR If African American >60 >60 mL/min/1.73 m 2    GFR If Non African American >60 >60 mL/min/1.73 m 2   URINALYSIS,CULTURE IF INDICATED    Collection Time: 09/06/18 12:56 PM   Result Value Ref Range    Color Yellow     Character Cloudy (A)     Specific Gravity 1.031 <1.035    Ph >=9.0 (A) 5.0 - 8.0    Glucose Negative Negative mg/dL    Ketones >=160  Negative mg/dL    Protein 30 (A) Negative mg/dL    Bilirubin Negative Negative    Urobilinogen, Urine 1.0 Negative    Nitrite Negative Negative    Leukocyte Esterase Trace (A) Negative    Occult Blood Negative Negative    Micro Urine Req Microscopic    URINE MICROSCOPIC (W/UA)    Collection Time: 09/06/18 12:56 PM   Result Value Ref Range    WBC 0-2 /hpf    RBC 5-10 (A) /hpf    Bacteria Moderate (A) None /hpf    Epithelial Cells Many (A) /hpf    Hyaline Cast 6-10 (A) /lpf   COMP METABOLIC PANEL    Collection Time: 09/06/18  1:15 PM   Result Value Ref Range    Sodium 133 (L) 135 - 145 mmol/L    Potassium 3.5 (L) 3.6 - 5.5 mmol/L    Chloride 100 96 - 112 mmol/L    Co2 20 20 - 33 mmol/L    Anion Gap 13.0 (H) 0.0 - 11.9    Glucose 76 65 - 99 mg/dL    Bun 6 (L) 8 - 22 mg/dL    Creatinine 0.56 0.50 - 1.40 mg/dL    Calcium 10.5 8.5 - 10.5 mg/dL    AST(SGOT) 18 12 - 45 U/L    ALT(SGPT) 14 2 - 50 U/L    Alkaline Phosphatase 64 30 - 99 U/L    Total Bilirubin 1.2 0.1 - 1.5 mg/dL    Albumin 5.6 (H) 3.2 - 4.9 g/dL    Total Protein 10.1 (H) 6.0 - 8.2 g/dL    Globulin 4.5 (H) 1.9 - 3.5 g/dL    A-G Ratio 1.2 g/dL   ESTIMATED GFR    Collection Time: 09/06/18  1:15 PM   Result Value Ref Range    GFR If African American >60 >60 mL/min/1.73 m 2    GFR If Non African American >60 >60 mL/min/1.73 m 2   CBC with Differential    Collection Time: 10/22/18  8:00 PM   Result Value Ref Range    WBC 9.8 4.8 - 10.8 K/uL    RBC 3.94 (L) 4.20 - 5.40 M/uL    Hemoglobin 11.6 (L) 12.0 - 16.0 g/dL    Hematocrit 34.3 (L) 37.0 - 47.0 %    MCV 87.1 81.4 - 97.8 fL    MCH 29.4 27.0 - 33.0 pg    MCHC 33.8 33.6 - 35.0 g/dL    RDW 40.1 35.9 - 50.0 fL    Platelet Count 328 164 - 446 K/uL    MPV 10.8 9.0 - 12.9 fL    Neutrophils-Polys 76.90 (H) 44.00 - 72.00 %    Lymphocytes 16.70 (L) 22.00 - 41.00 %    Monocytes 5.30 0.00 - 13.40 %    Eosinophils 0.50 0.00 - 6.90 %    Basophils 0.20 0.00 - 1.80 %    Immature Granulocytes 0.40 0.00 - 0.90 %    Nucleated RBC  0.00 /100 WBC    Neutrophils (Absolute) 7.51 (H) 2.00 - 7.15 K/uL    Lymphs (Absolute) 1.63 1.00 - 4.80 K/uL    Monos (Absolute) 0.52 0.00 - 0.85 K/uL    Eos (Absolute) 0.05 0.00 - 0.51 K/uL    Baso (Absolute) 0.02 0.00 - 0.12 K/uL    Immature Granulocytes (abs) 0.04 0.00 - 0.11 K/uL    NRBC (Absolute) 0.00 K/uL   Comp Metabolic Panel    Collection Time: 10/22/18  8:00 PM   Result Value Ref Range    Sodium 137 135 - 145 mmol/L    Potassium 3.5 (L) 3.6 - 5.5 mmol/L    Chloride 105 96 - 112 mmol/L    Co2 24 20 - 33 mmol/L    Anion Gap 8.0 0.0 - 11.9    Glucose 86 65 - 99 mg/dL    Bun 7 (L) 8 - 22 mg/dL    Creatinine 0.65 0.50 - 1.40 mg/dL    Calcium 9.3 8.5 - 10.5 mg/dL    AST(SGOT) 17 12 - 45 U/L    ALT(SGPT) 18 2 - 50 U/L    Alkaline Phosphatase 47 30 - 99 U/L    Total Bilirubin 0.5 0.1 - 1.5 mg/dL    Albumin 4.0 3.2 - 4.9 g/dL    Total Protein 7.2 6.0 - 8.2 g/dL    Globulin 3.2 1.9 - 3.5 g/dL    A-G Ratio 1.3 g/dL   ESTIMATED GFR    Collection Time: 10/22/18  8:00 PM   Result Value Ref Range    GFR If African American >60 >60 mL/min/1.73 m 2    GFR If Non African American >60 >60 mL/min/1.73 m 2   RH TYPE FOR RHOGAM FROM E.D.    Collection Time: 10/22/18  8:00 PM   Result Value Ref Range    Emergency Department Rh Typing POS     Number Of Rh Doses Indicated ZERO    URINALYSIS,CULTURE IF INDICATED    Collection Time: 10/23/18 12:01 AM   Result Value Ref Range    Color Yellow     Character Clear     Specific Gravity 1.026 <1.035    Ph 7.0 5.0 - 8.0    Glucose Negative Negative mg/dL    Ketones 40 (A) Negative mg/dL    Protein Negative Negative mg/dL    Bilirubin Negative Negative    Urobilinogen, Urine 1.0 Negative    Nitrite Negative Negative    Leukocyte Esterase Trace (A) Negative    Occult Blood Large (A) Negative    Micro Urine Req Microscopic    URINE MICROSCOPIC (W/UA)    Collection Time: 10/23/18 12:01 AM   Result Value Ref Range    WBC 2-5 /hpf    RBC  (A) /hpf    Bacteria Negative None /hpf     Epithelial Cells Few /hpf    Hyaline Cast 0-2 /lpf   ANTIBODY SCREEN    Collection Time: 01/08/19  9:40 AM   Result Value Ref Range    Antibody Screen Scrn NEG    HGB    Collection Time: 01/08/19  9:40 AM   Result Value Ref Range    Hemoglobin 11.0 (L) 12.0 - 16.0 g/dL   HCT    Collection Time: 01/08/19  9:40 AM   Result Value Ref Range    Hematocrit 35.4 (L) 37.0 - 47.0 %   RUBELLA ABS IGG    Collection Time: 01/08/19  9:40 AM   Result Value Ref Range    Rubella IgG Antibody 123.30 IU/mL   T.PALLIDUM AB EIA    Collection Time: 01/08/19  9:40 AM   Result Value Ref Range    Syphilis, Treponemal Qual Non Reactive Non Reactive   HEP B SURFACE ANTIGEN    Collection Time: 01/08/19  9:40 AM   Result Value Ref Range    Hepatitis B Surface Antigen Negative Negative   HEP C VIRUS ANTIBODY    Collection Time: 01/08/19  9:40 AM   Result Value Ref Range    Hepatitis C Antibody Negative Negative   HIV AG/AB COMBO ASSAY SCREENING    Collection Time: 01/08/19  9:40 AM   Result Value Ref Range    HIV Ag/Ab Combo Assay Non Reactive Non Reactive   ABO AND RH DETERMINATION    Collection Time: 01/08/19  9:40 AM   Result Value Ref Range    ABO Grouping Only B     Rh Grouping Only POS    GLUCOSE 1HR GESTATIONAL    Collection Time: 01/08/19  9:40 AM   Result Value Ref Range    Glucose, Post Dose 62 (L) 70 - 139 mg/dL   POC UA    Collection Time: 03/21/19  8:14 AM   Result Value Ref Range    POC Color Orange (A)     POC Appearance Cloudy (A)     POC Glucose Negative Negative mg/dL    POC Ketones Trace (A) Negative mg/dL    POC Specific Gravity 1.025 1.005 - 1.030    POC Blood Negative Negative    POC Urine PH 7.0 5.0 - 8.0    POC Protein 30 (A) Negative mg/dL    POC Nitrites Negative Negative    POC Leukocyte Esterase Negative Negative   POC UA    Collection Time: 03/23/19 11:05 AM   Result Value Ref Range    POC Color Yellow     POC Appearance Clear     POC Glucose Negative Negative mg/dL    POC Ketones Negative Negative mg/dL    POC  Specific Gravity 1.015 1.005 - 1.030    POC Blood Negative Negative    POC Urine PH 6.5 5.0 - 8.0    POC Protein Trace (A) Negative mg/dL    POC Nitrites Negative Negative    POC Leukocyte Esterase Negative Negative   POC UA    Collection Time: 04/03/19  5:43 PM   Result Value Ref Range    POC Color Kaylin     POC Appearance Slightly Cloudy (A)     POC Glucose Negative Negative mg/dL    POC Ketones Negative Negative mg/dL    POC Specific Gravity 1.015 1.005 - 1.030    POC Blood Negative Negative    POC Urine PH 7.5 5.0 - 8.0    POC Protein Trace (A) Negative mg/dL    POC Nitrites Negative Negative    POC Leukocyte Esterase Trace (A) Negative   Hold Blood Bank Specimen (Not Tested)    Collection Time: 04/08/19  4:18 PM   Result Value Ref Range    Holding Tube - Bb DONE    CBC WITH DIFFERENTIAL    Collection Time: 04/08/19  4:18 PM   Result Value Ref Range    WBC 7.9 4.8 - 10.8 K/uL    RBC 4.01 (L) 4.20 - 5.40 M/uL    Hemoglobin 9.8 (L) 12.0 - 16.0 g/dL    Hematocrit 31.7 (L) 37.0 - 47.0 %    MCV 79.1 (L) 81.4 - 97.8 fL    MCH 24.4 (L) 27.0 - 33.0 pg    MCHC 30.9 (L) 33.6 - 35.0 g/dL    RDW 43.7 35.9 - 50.0 fL    Platelet Count 333 164 - 446 K/uL    MPV 10.7 9.0 - 12.9 fL    Neutrophils-Polys 76.60 (H) 44.00 - 72.00 %    Lymphocytes 14.50 (L) 22.00 - 41.00 %    Monocytes 7.90 0.00 - 13.40 %    Eosinophils 0.40 0.00 - 6.90 %    Basophils 0.10 0.00 - 1.80 %    Immature Granulocytes 0.50 0.00 - 0.90 %    Nucleated RBC 0.00 /100 WBC    Neutrophils (Absolute) 6.08 2.00 - 7.15 K/uL    Lymphs (Absolute) 1.15 1.00 - 4.80 K/uL    Monos (Absolute) 0.63 0.00 - 0.85 K/uL    Eos (Absolute) 0.03 0.00 - 0.51 K/uL    Baso (Absolute) 0.01 0.00 - 0.12 K/uL    Immature Granulocytes (abs) 0.04 0.00 - 0.11 K/uL    NRBC (Absolute) 0.00 K/uL        Assessment:   Farrah Estrada at 38w2d  Labor status: Active phase labor.  Obstetrical history significant for There are no active problems to display for this patient.  .      Plan:      Admit to L&D  GBS Negative   S/p epidural   AROM -clear  Start pitocin and titrate if cervix unchanged after 2 hours  Anticipate

## 2019-04-09 NOTE — ANESTHESIA TIME REPORT
Anesthesia Start and Stop Event Times     Date Time Event    4/8/2019 1651 Anesthesia Start     2005 Anesthesia Stop        Responsible Staff  04/08/19    Name Role Begin End    Stephanie Hawkins M.D. Anesth 1651 2005        Preop Diagnosis (Free Text):  Pre-op Diagnosis             Preop Diagnosis (Codes):    Post op Diagnosis  Pain during labor      Premium Reason  A. 3PM - 7AM    Comments:

## 2019-04-09 NOTE — PROGRESS NOTES
Dr Twila Bray at bedside for AROM and SVE. Orders to recheck pt in 2 hours and if cervix unchanged okay per MD to start IV Pitocin starting at 2 kamari-units/min and go up by 1 kamari-unit/min q30 \mins.

## 2019-04-10 VITALS
SYSTOLIC BLOOD PRESSURE: 112 MMHG | BODY MASS INDEX: 25.49 KG/M2 | DIASTOLIC BLOOD PRESSURE: 72 MMHG | OXYGEN SATURATION: 100 % | WEIGHT: 153 LBS | HEART RATE: 73 BPM | TEMPERATURE: 98.4 F | RESPIRATION RATE: 18 BRPM | HEIGHT: 65 IN

## 2019-04-10 PROCEDURE — A9270 NON-COVERED ITEM OR SERVICE: HCPCS | Performed by: OBSTETRICS & GYNECOLOGY

## 2019-04-10 PROCEDURE — 700102 HCHG RX REV CODE 250 W/ 637 OVERRIDE(OP): Performed by: OBSTETRICS & GYNECOLOGY

## 2019-04-10 RX ADMIN — IBUPROFEN 600 MG: 600 TABLET ORAL at 07:32

## 2019-04-10 NOTE — DISCHARGE SUMMARY
Addendum to D/C Summary    Pt elected to stay an extra night for her convenience.   Pt without complaints and desires to be discharged this am.  Vitals stable   PE WNL    D/C to home  Pelvic rest for 6 weeks  Ambulate  Encourage breastfeeding  Depo-shot 150 mg IM prior to discharge if indicated  RhoGam 300 mcg IM prior to discharge if indicated  MMR vaccine SQ prior to discharge if indicated  Sitz bath PRN  F/U in 6 weeks

## 2019-04-10 NOTE — LACTATION NOTE
This note was copied from a baby's chart.  Met with mother earlier in the day. She was breastfeeding her baby in cradle hold and denies any pain. This is her 2nd baby, she nursed the first with good milk supply. Asked her to call my for the next feed so I can see how baby goes onto breast.     Checked on mother. Baby is asleep. She denies any breastfeeding problems and she didn't call for the last feeding. Will follow her tomorrow.

## 2019-04-10 NOTE — DISCHARGE INSTRUCTIONS
POSTPARTUM DISCHARGE INSTRUCTIONS FOR MOM    YOB: 1995   Age: 23 y.o.               Admit Date: 2019     Discharge Date: 4/10/2019  Attending Doctor:  Ana Parmar M.D.                  Allergies:  Amoxicillin; Aspirin; and Kiwi extract    Discharged to home by car. Discharged via wheelchair, hospital escort: Yes.  Special equipment needed: Not Applicable  Belongings with: Personal  Be sure to schedule a follow-up appointment with your primary care doctor or any specialists as instructed.     Discharge Plan:   Influenza Vaccine Indication: Patient Refuses    REASONS TO CALL YOUR OBSTETRICIAN:  1.   Persistent fever or shaking chills (Temperature higher than 100.4)  2.   Heavy bleeding (soaking more than 1 pad per hour); Passing clots  3.   Foul odor from vagina  4.   Mastitis (Breast infection; breast pain, chills, fever, redness)  5.   Urinary pain, burning or frequency  6.   Episiotomy infection  7.   Abdominal incision infection  8.   Severe depression longer than 24 hours    HAND WASHING  · Prior to handling the baby.  · Before breastfeeding or bottle feeding baby.  · After using the bathroom or changing the baby's diaper.    WOUND CARE  Ask your physician for additional care instructions.  In general:    ·  Incision:      · Keep clean and dry.    · Do NOT lift anything heavier than your baby for up to 6 weeks.    · There should not be any opening or pus.      VAGINAL CARE  · Nothing inside vagina for 6 weeks: no sexual intercourse, tampons or douching.  · Bleeding may continue for 2-4 weeks.  Amount may vary.    · Call your physician for heavy bleeding which means soaking more than 1 pad per hour    BIRTH CONTROL  · It is possible to become pregnant at any time after delivery and while breastfeeding.  · Plan to discuss a method of birth control with your physician at your follow up visit. visit.    DIET AND ELIMINATION  · Eating more fiber (bran cereal, fruits, and vegetables) and  "drinking plenty of fluids will help to avoid constipation.  · Urinary frequency after childbirth is normal.    POSTPARTUM BLUES  During the first few days after birth, you may experience a sense of the \"blues\" which may include impatience, irritability or even crying.  These feeling come and go quickly.  However, as many as 1 in 10 women experience emotional symptoms known as postpartum depression.    Postpartum depression:  May start as early as the second or third day after delivery or take several weeks or months to develop.  Symptoms of \"blues\" are present, but are more intense:  Crying spells; loss of appetite; feelings of hopelessness or loss of control; fear of touching the baby; over concern or no concern at all about the baby; little or no concern about your own appearance/caring for yourself; and/or inability to sleep or excessive sleeping.  Contact your physician if you are experiencing any of these symptoms.    Crisis Hotline:  · [place] Crisis Hotline:  9-355-IOIGLAO  Or 1-535.203.1954  · [place] Crisis Hotline:  1-607.203.6914  Or 913-573-7421    PREVENTING SHAKEN BABY:  If you are angry or stressed, PUT THE BABY IN THE CRIB, step away, take some deep breaths, and wait until you are calm to care for the baby.  DO NOT SHAKE THE BABY.  You are not alone, call a supporter for help.    · Crisis Call Center 24/7 crisis line 705-393-7136 or 1-921.202.2847  · You can also text them, text \"ANSWER\" to 836336    QUIT SMOKING/TOBACCO USE:  I understand the use of any tobacco products increases my chance of suffering from future heart disease and could cause other illnesses which may shorten my life. Quitting the use of tobacco products is the single most important thing I can do to improve my health. For further information on smoking / tobacco cessation call a Toll Free Quit Line at 1-142.889.9730 (*[employer] [place]) or 1-808.991.9113 (American Lung Association) or you can access the web based program " at www.lungusa.org.    · [place] Tobacco Users Help Line:  (529) 265-2238       Toll Free: 1-933.452.2384  · Quit Tobacco Program Pending sale to [facility] (403)599-2199    DEPRESSION / SUICIDE RISK:  As you are discharged from this [facility], it is important to learn how to keep safe from harming yourself.    Recognize the warning signs:  · Abrupt changes in personality, positive or negative- including increase in energy   · Giving away possessions  · Change in eating patterns- significant weight changes-  positive or negative  · Change in sleeping patterns- unable to sleep or sleeping all the time   · Unwillingness or inability to communicate  · Depression  · Unusual sadness, discouragement and loneliness  · Talk of wanting to die  · Neglect of personal appearance   · Rebelliousness- reckless behavior  · Withdrawal from people/activities they love  · Confusion- inability to concentrate     If you or a loved one observes any of these behaviors or has concerns about self-harm, here's what you can do:  · Talk about it- your feelings and reasons for harming yourself  · Remove any means that you might use to hurt yourself (examples: pills, rope, extension cords, firearm)  · Get professional help from the community (Mental Health, Substance Abuse, psychological counseling)  · Do not be alone:Call your Safe Contact- someone whom you trust who will be there for you.  · Call your local CRISIS HOTLINE 464-0576 or 527-326-8825  · Call your local Children's Mobile Crisis Response Team [place] (237) 233-7576 or www.[employer]  · Call the toll free National Suicide Prevention Hotlines   · National Suicide Prevention Lifeline 560-404-CEUU (1655)  · National Hope Line Network 800-SUICIDE (486-1394)    DISCHARGE SURVEY:  Thank you for choosing Pending sale to [facility].  We hope we provided you with very good care.  You may be receiving a survey in the mail.  Please fill it out.  Your opinion is valuable to  us.    ADDITIONAL EDUCATIONAL MATERIALS GIVEN TO PATIENT:        My signature on this form indicates that:  1.  I have reviewed and understand the above information  2.  My questions regarding this information have been answered to my satisfaction.  3.  I have formulated a plan with my discharge nurse to obtain my prescribed medication for home.

## 2019-04-10 NOTE — PROGRESS NOTES
Pt and fob state they are waiting discharge and have to be somewhere. Call to Dr Parmar. Okay to discharge pt. No medications ordered. Pt to follow up with Dr Parmar in 5 weeks.

## 2019-04-10 NOTE — LACTATION NOTE
This note was copied from a baby's chart.  Follow up before discharge:    Met with mother this morning. Asked her to call me for the next feeding but she said she forgot. She states that breastfeeding is going better than yesterday. She describes a deep latch with wide mouth.Baby is voiding and stooling. She denies nipple or breast pain. She was given a written list of OP lactation resources and encouraged to attend BF Circles as necessary.

## 2019-04-10 NOTE — PROGRESS NOTES
1900 Received bedside report from TR Hernandez. Discussed plan of care. Patient will call for pain medication as needed. All needs met at this time.

## 2019-04-11 NOTE — ADDENDUM NOTE
Encounter addended by: Rosanne Cage on: 4/11/2019  2:03 PM<BR>    Actions taken: Charge Capture section accepted